# Patient Record
Sex: FEMALE | Race: WHITE | Employment: OTHER | URBAN - METROPOLITAN AREA
[De-identification: names, ages, dates, MRNs, and addresses within clinical notes are randomized per-mention and may not be internally consistent; named-entity substitution may affect disease eponyms.]

---

## 2017-01-03 ENCOUNTER — TRANSFERRED RECORDS (OUTPATIENT)
Dept: HEALTH INFORMATION MANAGEMENT | Facility: CLINIC | Age: 62
End: 2017-01-03

## 2017-10-16 ENCOUNTER — TRANSFERRED RECORDS (OUTPATIENT)
Dept: HEALTH INFORMATION MANAGEMENT | Facility: CLINIC | Age: 62
End: 2017-10-16

## 2017-10-27 ENCOUNTER — TRANSFERRED RECORDS (OUTPATIENT)
Dept: HEALTH INFORMATION MANAGEMENT | Facility: CLINIC | Age: 62
End: 2017-10-27

## 2017-10-30 ENCOUNTER — TRANSFERRED RECORDS (OUTPATIENT)
Dept: HEALTH INFORMATION MANAGEMENT | Facility: CLINIC | Age: 62
End: 2017-10-30

## 2018-03-09 ENCOUNTER — OFFICE VISIT (OUTPATIENT)
Dept: FAMILY MEDICINE | Facility: CLINIC | Age: 63
End: 2018-03-09
Payer: COMMERCIAL

## 2018-03-09 VITALS
HEIGHT: 69 IN | BODY MASS INDEX: 20.06 KG/M2 | SYSTOLIC BLOOD PRESSURE: 134 MMHG | TEMPERATURE: 98.4 F | RESPIRATION RATE: 16 BRPM | DIASTOLIC BLOOD PRESSURE: 78 MMHG | OXYGEN SATURATION: 96 % | WEIGHT: 135.4 LBS | HEART RATE: 64 BPM

## 2018-03-09 DIAGNOSIS — E03.9 HYPOTHYROIDISM, UNSPECIFIED TYPE: Primary | ICD-10-CM

## 2018-03-09 DIAGNOSIS — F32.A DEPRESSION, UNSPECIFIED DEPRESSION TYPE: ICD-10-CM

## 2018-03-09 DIAGNOSIS — D22.9 MULTIPLE NEVI: ICD-10-CM

## 2018-03-09 DIAGNOSIS — Z85.89 HISTORY OF SQUAMOUS CELL CARCINOMA: ICD-10-CM

## 2018-03-09 DIAGNOSIS — Z90.710 H/O HYSTERECTOMY FOR BENIGN DISEASE: ICD-10-CM

## 2018-03-09 DIAGNOSIS — H40.9 GLAUCOMA OF LEFT EYE, UNSPECIFIED GLAUCOMA TYPE: ICD-10-CM

## 2018-03-09 PROCEDURE — 99204 OFFICE O/P NEW MOD 45 MIN: CPT | Performed by: NURSE PRACTITIONER

## 2018-03-09 RX ORDER — SERTRALINE HYDROCHLORIDE 100 MG/1
TABLET, FILM COATED ORAL DAILY
COMMUNITY
End: 2018-12-11

## 2018-03-09 RX ORDER — GLUCOSAM/CHONDRO/HERB 149/HYAL 750-100 MG
TABLET ORAL DAILY
COMMUNITY

## 2018-03-09 RX ORDER — TRAZODONE HYDROCHLORIDE 50 MG/1
TABLET, FILM COATED ORAL AT BEDTIME
COMMUNITY
End: 2018-12-11

## 2018-03-09 RX ORDER — TIMOLOL MALEATE 5 MG/ML
1 SOLUTION/ DROPS OPHTHALMIC DAILY
COMMUNITY
End: 2018-04-20

## 2018-03-09 RX ORDER — LEVOTHYROXINE SODIUM 88 UG/1
88 TABLET ORAL DAILY
COMMUNITY
End: 2018-12-12

## 2018-03-09 ASSESSMENT — ANXIETY QUESTIONNAIRES
1. FEELING NERVOUS, ANXIOUS, OR ON EDGE: SEVERAL DAYS
5. BEING SO RESTLESS THAT IT IS HARD TO SIT STILL: NOT AT ALL
GAD7 TOTAL SCORE: 4
3. WORRYING TOO MUCH ABOUT DIFFERENT THINGS: SEVERAL DAYS
IF YOU CHECKED OFF ANY PROBLEMS ON THIS QUESTIONNAIRE, HOW DIFFICULT HAVE THESE PROBLEMS MADE IT FOR YOU TO DO YOUR WORK, TAKE CARE OF THINGS AT HOME, OR GET ALONG WITH OTHER PEOPLE: NOT DIFFICULT AT ALL
7. FEELING AFRAID AS IF SOMETHING AWFUL MIGHT HAPPEN: SEVERAL DAYS
6. BECOMING EASILY ANNOYED OR IRRITABLE: NOT AT ALL
2. NOT BEING ABLE TO STOP OR CONTROL WORRYING: SEVERAL DAYS

## 2018-03-09 ASSESSMENT — PAIN SCALES - GENERAL: PAINLEVEL: NO PAIN (0)

## 2018-03-09 ASSESSMENT — PATIENT HEALTH QUESTIONNAIRE - PHQ9: 5. POOR APPETITE OR OVEREATING: NOT AT ALL
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
## 2018-03-09 NOTE — MR AVS SNAPSHOT
After Visit Summary   3/9/2018    Alma Rosa Bhat    MRN: [id number]           Patient Information     Date Of Birth          1955        Visit Information        Provider Department      3/9/2018 9:00 AM Jamaica Zamora APRN CNP [facility]        Today's Diagnoses     Hypothyroidism, unspecified type    -  1    Depression, unspecified depression type        Multiple nevi           Follow-ups after your visit        Additional Services     DERMATOLOGY REFERRAL       Your provider has referred you to: [facility] Sarah [facility]lap (2 locations) [phone]   [email or web address]    Please be aware that coverage of these services is subject to the terms and limitations of your health insurance plan.  Call member services at your health plan with any benefit or coverage questions.      Please bring the following with you to your appointment:    (1) Any X-Rays, CTs or MRIs which have been performed.  Contact the facility where they were done to arrange for  prior to your scheduled appointment.    (2) List of current medications  (3) This referral request   (4) Any documents/labs given to you for this referral                  Your next 10 appointments already scheduled     Mar 19, 2018 12:00 PM CDT   New Visit with Héctor Padron OD   [facility] ([facility])    [address] [facility] [address] [phone]              Who to contact     If you have questions or need follow up information about today's clinic visit or your schedule please contact [facility] directly at [phone].  Normal or non-critical lab and imaging results will be communicated to you by MyChart, letter or phone within 4 business days after the clinic has received the results. If you do not hear from us within 7 days, please contact the clinic through MyChart or phone. If you have  "a critical or abnormal lab result, we will notify you by phone as soon as possible.  Submit refill requests through Riiid or call your pharmacy and they will forward the refill request to us. Please allow 3 business days for your refill to be completed.          Additional Information About Your Visit        MyOtherDrivehart Information     Riiid gives you secure access to your electronic health record. If you see a primary care provider, you can also send messages to your care team and make appointments. If you have questions, please call your primary care clinic.  If you do not have a primary care provider, please call [phone] and they will assist you.        Care EveryWhere ID     This is your Care EveryWhere ID. This could be used by other organizations to access your [facility] medical records  [id number]        Your Vitals Were     Pulse Temperature Respirations Height Pulse Oximetry BMI (Body Mass Index)    64 98.4  F (36.9  C) (Oral) 16 1.74 m (5' 8.5\") 96% 20.29 kg/m2       Blood Pressure from Last 3 Encounters:   03/09/18 134/78    Weight from Last 3 Encounters:   03/09/18 61.4 kg (135 lb 6.4 oz)              We Performed the Following     DERMATOLOGY REFERRAL        Primary Care Provider Office Phone # Fax #    [facility] [phone]       [address]  [facility] [address]        Equal Access to Services     FLORESITA GARZA : Hadii aad ku hadasho Soomaali, waaxda luqadaha, qaybta kaalmada adeegyada, cheyenne oviedo . So [facility] [phone].    ATENCIÓN: Si habla español, tiene a schmidt disposición servicios gratuitos de asistencia lingüística. Llame al [phone].    We comply with applicable federal civil rights laws and [place] laws. We do not discriminate on the basis of race, color, national origin, age, disability, sex, sexual orientation, or gender identity.            Thank you!     Thank you for choosing [facility]" for your care. Our goal is always to provide you with excellent care. Hearing back from our patients is one way we can continue to improve our services. Please take a few minutes to complete the written survey that you may receive in the mail after your visit with us. Thank you!             Your Updated Medication List - Protect others around you: Learn how to safely use, store and throw away your medicines at www.disposemymeds.org.          This list is accurate as of 3/9/18  9:52 AM.  Always use your most recent med list.                   Brand Name Dispense Instructions for use Diagnosis    CALCIUM 500/VITAMIN D PO      Take by mouth daily        GLUCOSAMINE CHOND COMPLEX/MSM Tabs      Take by mouth daily        levothyroxine 88 MCG tablet    SYNTHROID/LEVOTHROID     Take 88 mcg by mouth daily        sertraline 100 MG tablet    ZOLOFT     Take by mouth daily        timolol 0.5 % ophthalmic solution    TIMOPTIC     Place 1 drop Into the left eye daily        traZODone 50 MG tablet    DESYREL     Take by mouth At Bedtime

## 2018-03-10 ASSESSMENT — PATIENT HEALTH QUESTIONNAIRE - PHQ9: SUM OF ALL RESPONSES TO PHQ QUESTIONS 1-9: 3

## 2018-03-10 ASSESSMENT — ANXIETY QUESTIONNAIRES: GAD7 TOTAL SCORE: 4

## 2018-03-19 ENCOUNTER — OFFICE VISIT (OUTPATIENT)
Dept: OPTOMETRY | Facility: CLINIC | Age: 63
End: 2018-03-19
Payer: COMMERCIAL

## 2018-03-19 DIAGNOSIS — H52.03 HYPERMETROPIA OF BOTH EYES: ICD-10-CM

## 2018-03-19 DIAGNOSIS — H40.1121 PRIMARY OPEN ANGLE GLAUCOMA OF LEFT EYE, MILD STAGE: Primary | ICD-10-CM

## 2018-03-19 DIAGNOSIS — Z98.890 HISTORY OF RADIAL KERATOTOMY: ICD-10-CM

## 2018-03-19 DIAGNOSIS — H10.13 ALLERGIC CONJUNCTIVITIS OF BOTH EYES: ICD-10-CM

## 2018-03-19 DIAGNOSIS — H52.223 REGULAR ASTIGMATISM OF BOTH EYES: ICD-10-CM

## 2018-03-19 DIAGNOSIS — H25.13 AGE-RELATED NUCLEAR CATARACT OF BOTH EYES: ICD-10-CM

## 2018-03-19 DIAGNOSIS — H52.4 PRESBYOPIA: ICD-10-CM

## 2018-03-19 PROCEDURE — 92004 COMPRE OPH EXAM NEW PT 1/>: CPT | Performed by: OPTOMETRIST

## 2018-03-19 PROCEDURE — 92015 DETERMINE REFRACTIVE STATE: CPT | Performed by: OPTOMETRIST

## 2018-03-19 RX ORDER — AZELASTINE HYDROCHLORIDE 0.5 MG/ML
1 SOLUTION/ DROPS OPHTHALMIC 2 TIMES DAILY
Qty: 1 BOTTLE | Refills: 11 | Status: SHIPPED | OUTPATIENT
Start: 2018-03-19

## 2018-03-19 ASSESSMENT — REFRACTION_MANIFEST
OD_ADD: +2.75
OD_CYLINDER: +0.75
OD_AXIS: 080
OS_ADD: +2.75
OS_AXIS: 070
OS_SPHERE: +2.50
OD_SPHERE: +2.25
OS_CYLINDER: +0.50

## 2018-03-19 ASSESSMENT — VISUAL ACUITY
OD_CC: 20/30-2
OS_SC: 20/250
OD_CC+: -1
METHOD: SNELLEN - LINEAR
OS_CC: 20/30
OS_CC+: -1
OD_SC: 20/150
OS_CC: 20/20
OD_CC: 20/20
CORRECTION_TYPE: GLASSES

## 2018-03-19 ASSESSMENT — REFRACTION_WEARINGRX
OD_ADD: +2.75
OD_CYLINDER: +0.50
OD_SPHERE: +2.25
SPECS_TYPE: PAL
OS_ADD: +2.75
OS_CYLINDER: +0.75
OD_AXIS: 080
OS_AXIS: 060
OS_SPHERE: +2.50

## 2018-03-19 ASSESSMENT — EXTERNAL EXAM - RIGHT EYE: OD_EXAM: NORMAL

## 2018-03-19 ASSESSMENT — EXTERNAL EXAM - LEFT EYE: OS_EXAM: NORMAL

## 2018-03-19 ASSESSMENT — TONOMETRY
IOP_METHOD: TONOPEN
OD_IOP_MMHG: 19
OS_IOP_MMHG: 20

## 2018-03-19 ASSESSMENT — SLIT LAMP EXAM - LIDS
COMMENTS: NORMAL
COMMENTS: NORMAL

## 2018-03-19 ASSESSMENT — CONF VISUAL FIELD
OD_NORMAL: 1
OS_NORMAL: 1

## 2018-03-19 ASSESSMENT — CUP TO DISC RATIO
OD_RATIO: 0.2
OS_RATIO: 0.2

## 2018-03-19 NOTE — LETTER
3/19/2018         RE: Alma Rosa Bhat  [address]  [place] [place] [address]        Dear Colleague,    Thank you for referring your patient, Alma Rosa Bhat, to the [facility]. Please see a copy of my visit note below.    Chief Complaint   Patient presents with     COMPREHENSIVE EYE EXAM     Glaucoma pt needs IOP         Last Eye Exam: 2-2017  Dilated Previously: Yes    What are you currently using to see?  glasses       Distance Vision Acuity: Noticed gradual change in both eyes    Near Vision Acuity: Satisfied with vision while reading  with glasses    Eye Comfort: good.os gets red a lot  Do you use eye drops? : Yes: timolol in AM,visine - had used Travatan- but pressure was not under control-  Occupation or Hobbies: retired    Moved from [place], [place]  Was being followed for glaucoma left eye only.  IOPs were as high as low 30s- Timolol- 5 % was most recently used with success.  Last Oct. was in the low 20s.    Brenda Hanson Optometric Assistant, A.B.O.C.          Medical, surgical and family histories reviewed and updated 3/19/2018.       OBJECTIVE: See Ophthalmology exam    ASSESSMENT:    ICD-10-CM    1. Primary open angle glaucoma of left eye, mild stage H40.1121 EYE EXAM (SIMPLE-NONBILLABLE)   2. Allergic conjunctivitis of both eyes H10.13 EYE EXAM (SIMPLE-NONBILLABLE)     azelastine (OPTIVAR) 0.05 % SOLN ophthalmic solution   3. Age-related nuclear cataract of both eyes H25.13 EYE EXAM (SIMPLE-NONBILLABLE)   4. History of radial keratotomy Z98.890 EYE EXAM (SIMPLE-NONBILLABLE)   5. Hypermetropia of both eyes H52.03 REFRACTION   6. Regular astigmatism of both eyes H52.223 REFRACTION   7. Presbyopia H52.4 REFRACTION      PLAN:     Patient Instructions   Continue Timoptic .5 % 1 drop left eye in am.    Schedule OCT/VF at [place].    Optivar- 1 drop both eyes 2 x day as needed for itchy eyes.    You have the start of mild cataracts.  You may notice some blurred vision or  glare with night driving.  It is important that you wear good sunglasses to protect your eyes from the ultraviolet light from the sun.  Taking a supplement with at least 300 mg/day of vitamin C appears to help prevent cataract development.  I recommend that you return in 1 year for an eye exam unless there are any sudden changes in your vision.       Return in 1 year for a complete eye exam or sooner if needed.    Héctor Padron, OD           Again, thank you for allowing me to participate in the care of your patient.        Sincerely,        Héctor Padron, OD

## 2018-03-19 NOTE — PATIENT INSTRUCTIONS
Continue Timoptic .5 % 1 drop left eye in am.    Schedule OCT/VF at [place].    Optivar- 1 drop both eyes 2 x day as needed for itchy eyes.    Genteal instead of Visine- 2-4 x day.    You have the start of mild cataracts.  You may notice some blurred vision or glare with night driving.  It is important that you wear good sunglasses to protect your eyes from the ultraviolet light from the sun.  Taking a supplement with at least 300 mg/day of vitamin C appears to help prevent cataract development.  I recommend that you return in 1 year for an eye exam unless there are any sudden changes in your vision.       Return in 1 year for a complete eye exam or sooner if needed.    Héctor Padron, OD    The affects of the dilating drops last for 4- 6 hours.  You will be more sensitive to light and vision will be blurry up close.  Mydriatic sunglasses were given if needed.      Optometry Providers       Clinic Locations                                 Telephone Number   Dr. Annabelle Sanabria [facility] [phone]     [facility] Hours:                [facility] Hours:       [facility] Hours:   [address]   [address]     [address] [place] [address]   [place] [address]    [place] [address]  Phone: [phone]                    Phone: [phone]     Phone: [phone]                      Monday 8:00-7:00                          Monday 8:00-7:00                          Monday 8:00-7:00              Tuesday 8:00-6:00                          Tuesday 8:00-7:00                          Tuesday 8:00-7:00              Wednesday 8:00-6:00                  Wednesday 8:00-7:00                   Wednesday 8:00-7:00      Thursday 8:00-6:00                        Thursday 8:00-7:00                         Thursday 8:00-7:00            Friday 8:00-5:00                               Friday 8:00-5:00                              Friday 8:00-5:00    [facility] Hours:   [address] [place] [address]  [phone]    Monday 8:00-7:00  Tuesday 8:00-7:00  Wednesday 8:00-7:00  Thursday 8:00-7:00  Friday 8:00-5:00  Please log on to [email or web address] to order your contact lenses.  The link is found on the Eye Care and Vision Services page.  As always, Thank you for trusting us with your health care needs!

## 2018-03-19 NOTE — MR AVS SNAPSHOT
After Visit Summary   3/19/2018    Alma Rosa Bhat    MRN: [id number]           Patient Information     Date Of Birth          1955        Visit Information        Provider Department      3/19/2018 12:00 PM Héctor Padron, MIGUEL [facility]        Today's Diagnoses     Primary open angle glaucoma of left eye, mild stage    -  1    Allergic conjunctivitis of both eyes        Age-related nuclear cataract of both eyes        History of radial keratotomy        Hypermetropia of both eyes        Regular astigmatism of both eyes        Presbyopia          Care Instructions    Continue Timoptic .5 % 1 drop left eye in am.    Schedule OCT/VF at [place].    Optivar- 1 drop both eyes 2 x day as needed for itchy eyes.    Genteal instead of Visine- 2-4 x day.    You have the start of mild cataracts.  You may notice some blurred vision or glare with night driving.  It is important that you wear good sunglasses to protect your eyes from the ultraviolet light from the sun.  Taking a supplement with at least 300 mg/day of vitamin C appears to help prevent cataract development.  I recommend that you return in 1 year for an eye exam unless there are any sudden changes in your vision.       Return in 1 year for a complete eye exam or sooner if needed.    Héctor Padron, MIGUEL    The affects of the dilating drops last for 4- 6 hours.  You will be more sensitive to light and vision will be blurry up close.  Mydriatic sunglasses were given if needed.      Optometry Providers       Clinic Locations                                 Telephone Number   Dr. Annabelle Sanabria [facility]   [place] [phone]     [facility] Hours:                [facility] Hours:       [facility] Hours:   [address]   [address]     [address] [place] [address]    [place] [address]    [place] [address]  Phone: [phone]                    Phone: [phone]     Phone: [phone]                      Monday 8:00-7:00                          Monday 8:00-7:00                          Monday 8:00-7:00              Tuesday 8:00-6:00                          Tuesday 8:00-7:00                          Tuesday 8:00-7:00              Wednesday 8:00-6:00                  Wednesday 8:00-7:00                   Wednesday 8:00-7:00      Thursday 8:00-6:00                        Thursday 8:00-7:00                         Thursday 8:00-7:00            Friday 8:00-5:00                              Friday 8:00-5:00                              Friday 8:00-5:00    [facility] Hours:   [address] [place] [address]  [phone]    Monday 8:00-7:00  Tuesday 8:00-7:00  Wednesday 8:00-7:00  Thursday 8:00-7:00  Friday 8:00-5:00  Please log on to [place] to order your contact lenses.  The link is found on the Eye Care and Vision Services page.  As always, Thank you for trusting us with your health care needs!              Follow-ups after your visit        Follow-up notes from your care team     Return for OCT/VF.      Your next 10 appointments already scheduled     Apr 20, 2018  8:40 AM CDT   Return Visit with Héctor Padron OD   [facility]    [address] [place] [address] [phone]              Who to contact     If you have questions or need follow up information about today's clinic visit or your schedule please contact [facility] directly at [phone].  Normal or non-critical lab and imaging results will be communicated to you by MyChart, letter or phone within 4 business days after the clinic has received the results. If you do not hear from us within 7 days, please contact the clinic through MyChart or phone. If you have a critical or abnormal lab result, we will  notify you by phone as soon as possible.  Submit refill requests through TeensSuccess or call your pharmacy and they will forward the refill request to us. Please allow 3 business days for your refill to be completed.          Additional Information About Your Visit        Back9 NetworkharStockr Information     [employer]Success gives you secure access to your electronic health record. If you see a primary care provider, you can also send messages to your care team and make appointments. If you have questions, please call your primary care clinic.  If you do not have a primary care provider, please call [phone] and they will assist you.        Care EveryWhere ID     This is your Care EveryWhere ID. This could be used by other organizations to access your [facility] medical records  [id number]         Blood Pressure from Last 3 Encounters:   03/09/18 134/78    Weight from Last 3 Encounters:   03/09/18 61.4 kg (135 lb 6.4 oz)              We Performed the Following     EYE EXAM (SIMPLE-NONBILLABLE)     REFRACTION          Today's Medication Changes          These changes are accurate as of 3/19/18  1:32 PM.  If you have any questions, ask your nurse or doctor.               Start taking these medicines.        Dose/Directions    azelastine 0.05 % Soln ophthalmic solution   Commonly known as:  OPTIVAR   Used for:  Allergic conjunctivitis of both eyes   Started by:  Héctor Padron, OD        Dose:  1 drop   Place 1 drop into both eyes 2 times daily   Quantity:  1 Bottle   Refills:  11            Where to get your medicines      These medications were sent to [facility] [address]   [address], [facility] [address]     Phone:  [phone]     azelastine 0.05 % Soln ophthalmic solution                Primary Care Provider Office Phone # Fax #    [facility] [phone]       [address]  [facility] [address]        Equal Access to Services     FLORESITA GARZA  AH: Danaii baldo amaya Benali, waaxda luqadaha, qaybta kakendal zacjerson, cheyenne krishnan haydiamond interianoalanoren nicole. So [facility] [phone].    ATENCIÓN: Si habla david, tiene a schmidt disposición servicios gratuitos de asistencia lingüística. Llame al [phone].    We comply with applicable federal civil rights laws and [place] laws. We do not discriminate on the basis of race, color, national origin, age, disability, sex, sexual orientation, or gender identity.            Thank you!     Thank you for choosing [facility]  for your care. Our goal is always to provide you with excellent care. Hearing back from our patients is one way we can continue to improve our services. Please take a few minutes to complete the written survey that you may receive in the mail after your visit with us. Thank you!             Your Updated Medication List - Protect others around you: Learn how to safely use, store and throw away your medicines at www.disposemymeds.org.          This list is accurate as of 3/19/18  1:32 PM.  Always use your most recent med list.                   Brand Name Dispense Instructions for use Diagnosis    azelastine 0.05 % Soln ophthalmic solution    OPTIVAR    1 Bottle    Place 1 drop into both eyes 2 times daily    Allergic conjunctivitis of both eyes       CALCIUM 500/VITAMIN D PO      Take by mouth daily        GLUCOSAMINE CHOND COMPLEX/MSM Tabs      Take by mouth daily        levothyroxine 88 MCG tablet    SYNTHROID/LEVOTHROID     Take 88 mcg by mouth daily        sertraline 100 MG tablet    ZOLOFT     Take by mouth daily        timolol 0.5 % ophthalmic solution    TIMOPTIC     Place 1 drop Into the left eye daily        traZODone 50 MG tablet    DESYREL     Take by mouth At Bedtime

## 2018-03-19 NOTE — PROGRESS NOTES
Chief Complaint   Patient presents with     COMPREHENSIVE EYE EXAM     Glaucoma pt needs IOP         Last Eye Exam: 2-2017  Dilated Previously: Yes    What are you currently using to see?  glasses       Distance Vision Acuity: Noticed gradual change in both eyes    Near Vision Acuity: Satisfied with vision while reading  with glasses    Eye Comfort: good.os gets red a lot  Do you use eye drops? : Yes: timolol in AM,visine - had used Travatan- but pressure was not under control-  Occupation or Hobbies: retired    Moved from [place]  Was being followed for glaucoma left eye only.  IOPs were as high as low 30s- Timolol- 5 % was most recently used with success.  Last Oct. was in the low 20s.    Brenda Hanson Optometric Assistant, A.B.O.C.          Medical, surgical and family histories reviewed and updated 3/19/2018.       OBJECTIVE: See Ophthalmology exam    ASSESSMENT:    ICD-10-CM    1. Primary open angle glaucoma of left eye, mild stage H40.1121 EYE EXAM (SIMPLE-NONBILLABLE)   2. Allergic conjunctivitis of both eyes H10.13 EYE EXAM (SIMPLE-NONBILLABLE)     azelastine (OPTIVAR) 0.05 % SOLN ophthalmic solution   3. Age-related nuclear cataract of both eyes H25.13 EYE EXAM (SIMPLE-NONBILLABLE)   4. History of radial keratotomy Z98.890 EYE EXAM (SIMPLE-NONBILLABLE)   5. Hypermetropia of both eyes H52.03 REFRACTION   6. Regular astigmatism of both eyes H52.223 REFRACTION   7. Presbyopia H52.4 REFRACTION      PLAN:     Patient Instructions   Continue Timoptic .5 % 1 drop left eye in am.    Schedule OCT/VF at [place].    Optivar- 1 drop both eyes 2 x day as needed for itchy eyes.    You have the start of mild cataracts.  You may notice some blurred vision or glare with night driving.  It is important that you wear good sunglasses to protect your eyes from the ultraviolet light from the sun.  Taking a supplement with at least 300 mg/day of vitamin C appears to help prevent cataract development.  I recommend  that you return in 1 year for an eye exam unless there are any sudden changes in your vision.       Return in 1 year for a complete eye exam or sooner if needed.    Héctor Padron, OD

## 2018-03-26 PROBLEM — K57.30 DIVERTICULOSIS OF LARGE INTESTINE WITHOUT HEMORRHAGE: Chronic | Status: ACTIVE | Noted: 2018-03-26

## 2018-03-26 PROBLEM — K57.30 DIVERTICULOSIS OF LARGE INTESTINE WITHOUT HEMORRHAGE: Status: ACTIVE | Noted: 2018-03-26

## 2018-03-26 PROBLEM — E78.5 HYPERLIPIDEMIA LDL GOAL <130: Status: ACTIVE | Noted: 2018-03-26

## 2018-04-20 ENCOUNTER — OFFICE VISIT (OUTPATIENT)
Dept: OPTOMETRY | Facility: CLINIC | Age: 63
End: 2018-04-20
Payer: COMMERCIAL

## 2018-04-20 DIAGNOSIS — H40.1121 PRIMARY OPEN ANGLE GLAUCOMA OF LEFT EYE, MILD STAGE: Primary | ICD-10-CM

## 2018-04-20 PROCEDURE — 92083 EXTENDED VISUAL FIELD XM: CPT | Performed by: OPTOMETRIST

## 2018-04-20 PROCEDURE — 76514 ECHO EXAM OF EYE THICKNESS: CPT | Performed by: OPTOMETRIST

## 2018-04-20 PROCEDURE — 99213 OFFICE O/P EST LOW 20 MIN: CPT | Performed by: OPTOMETRIST

## 2018-04-20 PROCEDURE — 92133 CPTRZD OPH DX IMG PST SGM ON: CPT | Performed by: OPTOMETRIST

## 2018-04-20 RX ORDER — TIMOLOL MALEATE 5 MG/ML
1 SOLUTION/ DROPS OPHTHALMIC DAILY
Qty: 1 BOTTLE | Refills: 11 | Status: SHIPPED | OUTPATIENT
Start: 2018-04-20 | End: 2019-06-18

## 2018-04-20 ASSESSMENT — VISUAL ACUITY
METHOD: SNELLEN - LINEAR
OS_CC: 20/20
OS_CC+: -1
OD_CC: 20/20
CORRECTION_TYPE: GLASSES

## 2018-04-20 ASSESSMENT — TONOMETRY
IOP_METHOD: TONOPEN
OS_IOP_MMHG: 17
OD_IOP_MMHG: 18

## 2018-04-20 ASSESSMENT — SLIT LAMP EXAM - LIDS
COMMENTS: NORMAL
COMMENTS: NORMAL

## 2018-04-20 ASSESSMENT — PACHYMETRY
OD_CT(UM): 567
OS_CT(UM): 564

## 2018-04-20 ASSESSMENT — EXTERNAL EXAM - LEFT EYE: OS_EXAM: NORMAL

## 2018-04-20 ASSESSMENT — CUP TO DISC RATIO
OD_RATIO: 0.2
OS_RATIO: 0.2

## 2018-04-20 ASSESSMENT — EXTERNAL EXAM - RIGHT EYE: OD_EXAM: NORMAL

## 2018-04-20 NOTE — PROGRESS NOTES
CHIEF COMPLAINT:   Chief Complaint   Patient presents with     Glaucoma Follow Up     OCT and Visual Field       Has been using Timoptic .5 % - 1 drop left eye due to increased IOP.    OBJECTIVE:     See ophthalmology exam    ASSESSMENT:         ICD-10-CM    1. Primary open angle glaucoma of left eye, mild stage H40.1121 timolol (TIMOPTIC) 0.5 % ophthalmic solution      OPHTHALMIC DIAG, PACHYMETRY     HVF 24-2 OD     OCT Optic Nerve RNFL Optovue OU (both eyes)   History of increased IOP left eye  IOPs within normal limits both eyes   Visual Field- within normal limits both eyes   OCT- within normal limits both eyes   Thick corneas both eyes     PLAN:      Patient Instructions   Continue Timoptic .5 % 1 drop left eye daily.    Return in 1 year for a complete eye exam and glaucoma testing or sooner if needed.    Héctor Padron, MIGUEL

## 2018-04-20 NOTE — MR AVS SNAPSHOT
After Visit Summary   4/20/2018    Alma Rosa Bhat    MRN: [id number]           Patient Information     Date Of Birth          1955        Visit Information        Provider Department      4/20/2018 8:40 AM Héctor Padron, OD [facility]        Today's Diagnoses     Primary open angle glaucoma of left eye, mild stage    -  1      Care Instructions    Continue Timoptic .5 % 1 drop left eye daily.    Return in 1 year for a complete eye exam and glaucoma testing or sooner if needed.    Héctor Padron OD            Follow-ups after your visit        Follow-up notes from your care team     Return in about 1 year (around 4/20/2019) for Annual Visit.      Who to contact     If you have questions or need follow up information about today's clinic visit or your schedule please contact [facility] directly at [phone].  Normal or non-critical lab and imaging results will be communicated to you by FluxDrivehart, letter or phone within 4 business days after the clinic has received the results. If you do not hear from us within 7 days, please contact the clinic through FluxDrivehart or phone. If you have a critical or abnormal lab result, we will notify you by phone as soon as possible.  Submit refill requests through SocialMeterTV or call your pharmacy and they will forward the refill request to us. Please allow 3 business days for your refill to be completed.          Additional Information About Your Visit        FluxDrivehart Information     SocialMeterTV gives you secure access to your electronic health record. If you see a primary care provider, you can also send messages to your care team and make appointments. If you have questions, please call your primary care clinic.  If you do not have a primary care provider, please call [phone] and they will assist you.      SocialMeterTV is an electronic gateway that provides easy, online access to your medical records. With SocialMeterTV, you can request a  clinic appointment, read your test results, renew a prescription or communicate with your care team.     To access your existing account, please contact your [facility] or call [phone] for assistance.        Care EveryWhere ID     This is your Care EveryWhere ID. This could be used by other organizations to access your [facility] medical records  [id number]         Blood Pressure from Last 3 Encounters:   03/09/18 134/78    Weight from Last 3 Encounters:   03/09/18 61.4 kg (135 lb 6.4 oz)              We Performed the Following     HVF 24-2 OD     OCT Optic Nerve RNFL Optovue OU (both eyes)     US OPHTHALMIC DIAG, PACHYMETRY          Where to get your medicines      These medications were sent to [facility] - [facility] [address]  [address], [facility] [address]     Phone:  [phone]     timolol 0.5 % ophthalmic solution          Primary Care Provider Office Phone # Fax #    [facility] [phone] [address]  [facility] [address]        Equal Access to Services     CAROLYN GARZA : Hadii aad ku hadasho Soomaali, waaxda luqadaha, qaybta kaalmada adeegyada, waxay eulogio oviedo . So [facility] [phone].    ATENCIÓN: Si habla español, tiene a schmidt disposición servicios gratuitos de asistencia lingüística. [facility] [phone].    We comply with applicable federal civil rights laws and [place] laws. We do not discriminate on the basis of race, color, national origin, age, disability, sex, sexual orientation, or gender identity.            Thank you!     Thank you for choosing [facility]  for your care. Our goal is always to provide you with excellent care. Hearing back from our patients is one way we can continue to improve our services. Please take a few minutes to complete the written survey that you may receive in the mail after your visit with us. Thank  you!             Your Updated Medication List - Protect others around you: Learn how to safely use, store and throw away your medicines at www.disposemymeds.org.          This list is accurate as of 4/20/18  9:51 AM.  Always use your most recent med list.                   Brand Name Dispense Instructions for use Diagnosis    azelastine 0.05 % Soln ophthalmic solution    OPTIVAR    1 Bottle    Place 1 drop into both eyes 2 times daily    Allergic conjunctivitis of both eyes       CALCIUM 500/VITAMIN D PO      Take by mouth daily        estradiol 0.1 MG/GM cream    ESTRACE    42.5 g    Place 2 g vaginally twice a week        GLUCOSAMINE CHOND COMPLEX/MSM Tabs      Take by mouth daily        levothyroxine 88 MCG tablet    SYNTHROID/LEVOTHROID     Take 88 mcg by mouth daily        sertraline 100 MG tablet    ZOLOFT     Take by mouth daily        timolol 0.5 % ophthalmic solution    TIMOPTIC    1 Bottle    Place 1 drop Into the left eye daily    Primary open angle glaucoma of left eye, mild stage       traZODone 50 MG tablet    DESYREL     Take by mouth At Bedtime

## 2018-04-20 NOTE — PATIENT INSTRUCTIONS
Continue Timoptic .5 % 1 drop left eye daily.    Return in 1 year for a complete eye exam and glaucoma testing or sooner if needed.    Héctor Padron, OD

## 2018-04-20 NOTE — LETTER
4/20/2018         RE: Alma Rosa Bhat  [address] [place] [place] [address]        Dear Colleague,    Thank you for referring your patient, Alma Rosa Bhat, to the [facility]. Please see a copy of my visit note below.    CHIEF COMPLAINT:   Chief Complaint   Patient presents with     Glaucoma Follow Up     OCT and Visual Field       Has been using Timoptic .5 % - 1 drop left eye due to increased IOP.    OBJECTIVE:     See ophthalmology exam    ASSESSMENT:         ICD-10-CM    1. Primary open angle glaucoma of left eye, mild stage H40.1121 timolol (TIMOPTIC) 0.5 % ophthalmic solution      OPHTHALMIC DIAG, PACHYMETRY     HVF 24-2 OD     OCT Optic Nerve RNFL Optovue OU (both eyes)   History of increased IOP left eye  IOPs within normal limits both eyes   Visual Field- within normal limits both eyes   OCT- within normal limits both eyes   Thick corneas both eyes     PLAN:      Patient Instructions   Continue Timoptic .5 % 1 drop left eye daily.    Return in 1 year for a complete eye exam and glaucoma testing or sooner if needed.    Héctor Padron, MIGUEL          Again, thank you for allowing me to participate in the care of your patient.        Sincerely,        Héctor Padron, OD

## 2018-12-11 ENCOUNTER — OFFICE VISIT (OUTPATIENT)
Dept: FAMILY MEDICINE | Facility: CLINIC | Age: 63
End: 2018-12-11
Payer: COMMERCIAL

## 2018-12-11 ENCOUNTER — ANCILLARY PROCEDURE (OUTPATIENT)
Dept: MAMMOGRAPHY | Facility: CLINIC | Age: 63
End: 2018-12-11
Attending: NURSE PRACTITIONER
Payer: COMMERCIAL

## 2018-12-11 VITALS
OXYGEN SATURATION: 97 % | DIASTOLIC BLOOD PRESSURE: 78 MMHG | HEIGHT: 69 IN | HEART RATE: 72 BPM | WEIGHT: 141 LBS | TEMPERATURE: 97.7 F | BODY MASS INDEX: 20.88 KG/M2 | SYSTOLIC BLOOD PRESSURE: 118 MMHG

## 2018-12-11 DIAGNOSIS — E03.9 HYPOTHYROIDISM, UNSPECIFIED TYPE: ICD-10-CM

## 2018-12-11 DIAGNOSIS — F32.A DEPRESSION, UNSPECIFIED DEPRESSION TYPE: ICD-10-CM

## 2018-12-11 DIAGNOSIS — E78.5 HYPERLIPIDEMIA LDL GOAL <130: ICD-10-CM

## 2018-12-11 DIAGNOSIS — Z23 NEED FOR PROPHYLACTIC VACCINATION AGAINST STREPTOCOCCUS PNEUMONIAE (PNEUMOCOCCUS): ICD-10-CM

## 2018-12-11 DIAGNOSIS — Z23 NEED FOR PROPHYLACTIC VACCINATION AND INOCULATION AGAINST INFLUENZA: ICD-10-CM

## 2018-12-11 DIAGNOSIS — Z12.31 VISIT FOR SCREENING MAMMOGRAM: ICD-10-CM

## 2018-12-11 DIAGNOSIS — Z00.00 ENCOUNTER FOR ROUTINE ADULT HEALTH EXAMINATION WITHOUT ABNORMAL FINDINGS: Primary | ICD-10-CM

## 2018-12-11 DIAGNOSIS — Z23 NEED FOR TDAP VACCINATION: ICD-10-CM

## 2018-12-11 DIAGNOSIS — Z23 NEED FOR ZOSTER VACCINE: ICD-10-CM

## 2018-12-11 LAB
ANION GAP SERPL CALCULATED.3IONS-SCNC: 6 MMOL/L (ref 3–14)
BUN SERPL-MCNC: 11 MG/DL (ref 7–30)
CALCIUM SERPL-MCNC: 8.6 MG/DL (ref 8.5–10.1)
CHLORIDE SERPL-SCNC: 105 MMOL/L (ref 94–109)
CHOLEST SERPL-MCNC: 232 MG/DL
CO2 SERPL-SCNC: 31 MMOL/L (ref 20–32)
CREAT SERPL-MCNC: 0.73 MG/DL (ref 0.52–1.04)
GFR SERPL CREATININE-BSD FRML MDRD: 79 ML/MIN/1.7M2
GLUCOSE SERPL-MCNC: 91 MG/DL (ref 70–99)
HDLC SERPL-MCNC: 56 MG/DL
LDLC SERPL CALC-MCNC: 149 MG/DL
NONHDLC SERPL-MCNC: 176 MG/DL
POTASSIUM SERPL-SCNC: 4.6 MMOL/L (ref 3.4–5.3)
SODIUM SERPL-SCNC: 142 MMOL/L (ref 133–144)
T4 FREE SERPL-MCNC: 1.12 NG/DL (ref 0.76–1.46)
TRIGL SERPL-MCNC: 133 MG/DL
TSH SERPL DL<=0.005 MIU/L-ACNC: 4.39 MU/L (ref 0.4–4)

## 2018-12-11 PROCEDURE — 90472 IMMUNIZATION ADMIN EACH ADD: CPT | Performed by: NURSE PRACTITIONER

## 2018-12-11 PROCEDURE — 77067 SCR MAMMO BI INCL CAD: CPT | Mod: TC

## 2018-12-11 PROCEDURE — 84443 ASSAY THYROID STIM HORMONE: CPT | Performed by: NURSE PRACTITIONER

## 2018-12-11 PROCEDURE — 90750 HZV VACC RECOMBINANT IM: CPT | Performed by: NURSE PRACTITIONER

## 2018-12-11 PROCEDURE — 80061 LIPID PANEL: CPT | Performed by: NURSE PRACTITIONER

## 2018-12-11 PROCEDURE — G0438 PPPS, INITIAL VISIT: HCPCS | Performed by: NURSE PRACTITIONER

## 2018-12-11 PROCEDURE — 84439 ASSAY OF FREE THYROXINE: CPT | Performed by: NURSE PRACTITIONER

## 2018-12-11 PROCEDURE — 90732 PPSV23 VACC 2 YRS+ SUBQ/IM: CPT | Performed by: NURSE PRACTITIONER

## 2018-12-11 PROCEDURE — 80048 BASIC METABOLIC PNL TOTAL CA: CPT | Performed by: NURSE PRACTITIONER

## 2018-12-11 PROCEDURE — 90715 TDAP VACCINE 7 YRS/> IM: CPT | Performed by: NURSE PRACTITIONER

## 2018-12-11 PROCEDURE — 36415 COLL VENOUS BLD VENIPUNCTURE: CPT | Performed by: NURSE PRACTITIONER

## 2018-12-11 PROCEDURE — 90662 IIV NO PRSV INCREASED AG IM: CPT | Performed by: NURSE PRACTITIONER

## 2018-12-11 PROCEDURE — G0009 ADMIN PNEUMOCOCCAL VACCINE: HCPCS | Mod: 59 | Performed by: NURSE PRACTITIONER

## 2018-12-11 PROCEDURE — 90471 IMMUNIZATION ADMIN: CPT | Performed by: NURSE PRACTITIONER

## 2018-12-11 PROCEDURE — G0008 ADMIN INFLUENZA VIRUS VAC: HCPCS | Mod: 59 | Performed by: NURSE PRACTITIONER

## 2018-12-11 RX ORDER — SERTRALINE HYDROCHLORIDE 100 MG/1
50 TABLET, FILM COATED ORAL DAILY
Qty: 90 TABLET | Refills: 0 | Status: SHIPPED | OUTPATIENT
Start: 2018-12-11 | End: 2019-06-18

## 2018-12-11 RX ORDER — TRAZODONE HYDROCHLORIDE 50 MG/1
50 TABLET, FILM COATED ORAL AT BEDTIME
Qty: 30 TABLET | Refills: 3 | Status: SHIPPED | OUTPATIENT
Start: 2018-12-11 | End: 2019-10-30

## 2018-12-11 ASSESSMENT — PATIENT HEALTH QUESTIONNAIRE - PHQ9
5. POOR APPETITE OR OVEREATING: SEVERAL DAYS
SUM OF ALL RESPONSES TO PHQ QUESTIONS 1-9: 3

## 2018-12-11 ASSESSMENT — ANXIETY QUESTIONNAIRES
6. BECOMING EASILY ANNOYED OR IRRITABLE: SEVERAL DAYS
1. FEELING NERVOUS, ANXIOUS, OR ON EDGE: NOT AT ALL
3. WORRYING TOO MUCH ABOUT DIFFERENT THINGS: NOT AT ALL
2. NOT BEING ABLE TO STOP OR CONTROL WORRYING: NOT AT ALL
GAD7 TOTAL SCORE: 3
7. FEELING AFRAID AS IF SOMETHING AWFUL MIGHT HAPPEN: NOT AT ALL
5. BEING SO RESTLESS THAT IT IS HARD TO SIT STILL: SEVERAL DAYS

## 2018-12-11 ASSESSMENT — PAIN SCALES - GENERAL: PAINLEVEL: NO PAIN (0)

## 2018-12-11 ASSESSMENT — MIFFLIN-ST. JEOR: SCORE: 1241.01

## 2018-12-11 NOTE — NURSING NOTE
Screening Questionnaire for Adult Immunization    Are you sick today?   No   Do you have allergies to medications, food, a vaccine component or latex?   No   Have you ever had a serious reaction after receiving a vaccination?   No   Do you have a long-term health problem with heart disease, lung disease, asthma, kidney disease, metabolic disease (e.g. diabetes), anemia, or other blood disorder?   No   Do you have cancer, leukemia, HIV/AIDS, or any other immune system problem?   No   In the past 3 months, have you taken medications that affect  your immune system, such as prednisone, other steroids, or anticancer drugs; drugs for the treatment of rheumatoid arthritis, Crohn s disease, or psoriasis; or have you had radiation treatments?   No   Have you had a seizure, or a brain or other nervous system problem?   No   During the past year, have you received a transfusion of blood or blood     products, or been given immune (gamma) globulin or antiviral drug?   No   For women: Are you pregnant or is there a chance you could become        pregnant during the next month?   No   Have you received any vaccinations in the past 4 weeks?   No     Immunization questionnaire answers were all negative.        Per orders of provider, injection of Tdap, Shingrix and PCV 23 given by Josefina Perkins. Patient instructed to remain in clinic for 15 minutes afterwards, and to report any adverse reaction to me immediately.       Screening performed by Josefina Perkins on 12/11/2018 at 3:08 PM.

## 2018-12-11 NOTE — PROGRESS NOTES

## 2018-12-11 NOTE — PROGRESS NOTES
"SUBJECTIVE:   Alma Rosa Bhat is a 63 year old female who presents for Preventive Visit.    Are you in the first 12 months of your Medicare Part B coverage?  No    Physical Health:    In general, how would you rate your overall physical health? excellent    Outside of work, how many days during the week do you exercise? 2-3 days/week    Outside of work, approximately how many minutes a day do you exercise?30-45 minutes    If you drink alcohol do you typically have >3 drinks per day or >7 drinks per week? No    Do you usually eat at least 4 servings of fruit and vegetables a day, include whole grains & fiber and avoid regularly eating high fat or \"junk\" foods? Yes    Do you have any problems taking medications regularly?  No    Do you have any side effects from medications? none    Needs assistance for the following daily activities: no assistance needed    Which of the following safety concerns are present in your home?  none identified     Hearing impairment: No    In the past 6 months, have you been bothered by leaking of urine? yes    Mental Health:    In general, how would you rate your overall mental or emotional health? excellent  PHQ-2 Score:        Depression:  Cut down to 50 mg of sertraline within last month; feeling well- she'd like to continue to wean off.  She will do this as tolerated.  Occasionally needs trazodone to sleep but not very often.  Overall, she is engaging in a lot of community events.  She is learning to weld and make ceramics.  She feels fulfilled and healthy.    Do you feel safe in your environment? Yes    Do you have a Health Care Directive? Yes: Patient states has Advance Directive and will bring in a copy to clinic.    Additional concerns to address?  No    Fall risk:  Fallen 2 or more times in the past year?: No  Any fall with injury in the past year?: No    Cognitive Screenin) Repeat 3 items (Leader, Season, Table)    2) Clock draw: NORMAL  3) 3 item recall: Recalls 3 " objects  Results: 3 items recalled: COGNITIVE IMPAIRMENT LESS LIKELY    Mini-CogTM Copyright YARIEL Villanueva. Licensed by the author for use in [facility]; reprinted with permission ([email or web address]@.[facility]). All rights reserved.      Do you have sleep apnea, excessive snoring or daytime drowsiness?: yes        Reviewed and updated as needed this visit by clinical staff  Allergies  Meds         Reviewed and updated as needed this visit by Provider  Tobacco  Allergies  Meds  Med Hx  Surg Hx  Fam Hx  Soc Hx       Social History     Tobacco Use     Smoking status: Never Smoker     Smokeless tobacco: Never Used   Substance Use Topics     Alcohol use: Yes     Comment: moderate                           Current providers sharing in care for this patient include:   Patient Care Team:  Yadira [place] Isaura Weaver as PCP - General    The following health maintenance items are reviewed in Epic and correct as of today:  Health Maintenance   Topic Date Due     DTAP/TDAP/TD IMMUNIZATION (1 - Tdap) 04/18/1960     DEPRESSION ACTION PLAN  04/18/1971     HIV SCREEN (SYSTEM ASSIGNED)  04/18/1971     MAMMO SCREEN Q2 YR (SYSTEM ASSIGNED)  04/18/1993     LIPID SCREEN Q5 YR FEMALE (SYSTEM ASSIGNED)  04/18/1998     ZOSTER IMMUNIZATION (1 of 2) 04/18/2003     ADVANCE DIRECTIVE PLANNING Q5 YRS  04/18/2008     FALL RISK ASSESSMENT  04/18/2018     DEXA SCAN SCREENING (SYSTEM ASSIGNED)  04/18/2018     PNEUMOVAX IMMUNIZATION 65+ HIGH/HIGHEST RISK (1 of 2 - PCV13) 04/18/2018     INFLUENZA VACCINE (1) 09/01/2018     PHQ-9 Q6 MONTHS  09/09/2018     COLON CANCER SCREEN (SYSTEM ASSIGNED)  10/01/2026     HEPATITIS C SCREENING  Completed     IPV IMMUNIZATION  Aged Out     MENINGITIS IMMUNIZATION  Aged Out     Labs reviewed in EPIC  BP Readings from Last 3 Encounters:   12/11/18 118/78   03/09/18 134/78    Wt Readings from Last 3 Encounters:   12/11/18 64 kg (141 lb)   03/09/18 61.4 kg (135 lb 6.4 oz)                  Patient Active Problem List    Diagnosis     Hypothyroidism, unspecified type     Depression, unspecified depression type     History of endometriosis     Glaucoma, left eye     Hiatal hernia     History of squamous cell carcinoma     History of goiter     H/O hysterectomy for benign disease     Age-related nuclear cataract of both eyes     History of radial keratotomy     Hyperlipidemia LDL goal <130     Diverticulosis of large intestine without hemorrhage     Past Surgical History:   Procedure Laterality Date     AS ESOPHAGOSCOPY, DIAGNOSTIC  10/2016    reflux gastritis andmild antral gastritis, and benign fundic gland polyp of stomach found     COLONOSCOPY  10/2016     ENHANCE LASER REFRACTIVE BILATERAL EXISTING PT IN PARAMETERS       HYSTERECTOMY  1990    has one ovary     THYROIDECTOMY  2006      BREAST BIOPSY CORE NEEDLE RIGHT Right 2017       Social History     Tobacco Use     Smoking status: Never Smoker     Smokeless tobacco: Never Used   Substance Use Topics     Alcohol use: Yes     Comment: moderate     Family History   Problem Relation Age of Onset     Cancer Mother      Cerebrovascular Disease Mother      Cancer Father      Diabetes Maternal Grandmother      Cancer Brother      Cancer Sister      Glaucoma Paternal Grandmother          Current Outpatient Medications   Medication Sig Dispense Refill     azelastine (OPTIVAR) 0.05 % SOLN ophthalmic solution Place 1 drop into both eyes 2 times daily 1 Bottle 11     Calcium Carbonate-Vitamin D (CALCIUM 500/VITAMIN D PO) Take by mouth daily       estradiol (ESTRACE) 0.1 MG/GM cream Place 2 g vaginally twice a week 42.5 g      levothyroxine (SYNTHROID/LEVOTHROID) 88 MCG tablet Take 88 mcg by mouth daily       Misc Natural Products (GLUCOSAMINE CHOND COMPLEX/MSM) TABS Take by mouth daily       sertraline (ZOLOFT) 100 MG tablet Take 0.5 tablets (50 mg) by mouth daily 90 tablet 0     timolol (TIMOPTIC) 0.5 % ophthalmic solution Place 1 drop Into the left eye daily 1 Bottle 11     traZODone  "(DESYREL) 50 MG tablet Take 1 tablet (50 mg) by mouth At Bedtime 30 tablet 3     No Known Allergies  No lab results found.   Pneumonia Vaccine:Adults age 65+ who received their first dose of Pneumovax (PPSV23) prior to age 65 years: Should be given PCV 13 > 1 year after their most recent PPSV23 AND should be given a another dose of PPSV23 > 5 years after their most recent dose of PPSV23  Mammogram Screening: Mammogram Screening: Patient over age 50, mutual decision to screen reflected in health maintenance.    ROS:  Constitutional, HEENT, cardiovascular, pulmonary, GI, , musculoskeletal, neuro, skin, endocrine and psych systems are negative, except as otherwise noted.    OBJECTIVE:   /79   Pulse 72   Temp 97.7  F (36.5  C) (Oral)   Ht 1.74 m (5' 8.5\")   Wt 64 kg (141 lb)   SpO2 97%   BMI 21.13 kg/m   Estimated body mass index is 21.13 kg/m  as calculated from the following:    Height as of this encounter: 1.74 m (5' 8.5\").    Weight as of this encounter: 64 kg (141 lb).  EXAM:   GENERAL: healthy, alert and no distress  EYES: Eyes grossly normal to inspection, PERRL and conjunctivae and sclerae normal  HENT: ear canals and TM's normal, nose and mouth without ulcers or lesions  NECK: no adenopathy, no asymmetry, masses, or scars and thyroid normal to palpation  RESP: lungs clear to auscultation - no rales, rhonchi or wheezes  CV: regular rate and rhythm, normal S1 S2, no S3 or S4, no murmur, click or rub, no peripheral edema and peripheral pulses strong  ABDOMEN: soft, nontender, no hepatosplenomegaly, no masses and bowel sounds normal  MS: no gross musculoskeletal defects noted, no edema  SKIN: no suspicious lesions or rashes  NEURO: Normal strength and tone, mentation intact and speech normal  PSYCH: mentation appears normal, affect normal/bright    Diagnostic Test Results:  No results found for this or any previous visit (from the past 24 hour(s)).    ASSESSMENT / PLAN:   1. Encounter for routine " adult health examination without abnormal findings  Doing well.  Labs as below.  Would like to hold off on DEXA until next year.    - Basic metabolic panel  (Ca, Cl, CO2, Creat, Gluc, K, Na, BUN)    2. Hyperlipidemia LDL goal <130  - Lipid panel reflex to direct LDL Non-fasting    3. Hypothyroidism, unspecified type  - TSH with free T4 reflex    4. Depression, unspecified depression type  Refilled.  See HPI.  Stable.    - traZODone (DESYREL) 50 MG tablet; Take 1 tablet (50 mg) by mouth At Bedtime  Dispense: 30 tablet; Refill: 3  - sertraline (ZOLOFT) 100 MG tablet; Take 0.5 tablets (50 mg) by mouth daily  Dispense: 90 tablet; Refill: 0    5. Visit for screening mammogram  - MA Screening Digital Bilateral; Future    6. Need for Tdap vaccination  - TDAP, IM (10 - 64 YRS) - Adacel    7. Need for zoster vaccine  - ZOSTER VACCINE RECOMBINANT ADJUVANTED IM NJX    8. Need for prophylactic vaccination against Streptococcus pneumoniae (pneumococcus)  - Pneumococcal vaccine 23 valent PPSV23  (Pneumovax) [68677]    9. Need for prophylactic vaccination and inoculation against influenza  - FLU VACCINE, INCREASED ANTIGEN, PRESV FREE, AGE 65+ [07201]  - Vaccine Administration, Initial [08630]    End of Life Planning:  Patient currently has an advanced directive: No.  I have verified the patient's ablity to prepare an advanced directive/make health care decisions.  Literature was provided to assist patient in preparing an advanced directive.    COUNSELING:  Reviewed preventive health counseling, as reflected in patient instructions       Regular exercise       Healthy diet/nutrition       Vision screening       Dental care       Immunizations    Vaccinated for: Influenza, Pneumococcal, TDAP and Zoster             Osteoporosis Prevention/Bone Health    BP Readings from Last 1 Encounters:   12/11/18 132/79     Estimated body mass index is 21.13 kg/m  as calculated from the following:    Height as of this encounter: 1.74 m (5'  "8.5\").    Weight as of this encounter: 64 kg (141 lb).           reports that  has never smoked. she has never used smokeless tobacco.      Appropriate preventive services were discussed with this patient, including applicable screening as appropriate for cardiovascular disease, diabetes, osteopenia/osteoporosis, and glaucoma.  As appropriate for age/gender, discussed screening for colorectal cancer, prostate cancer, breast cancer, and cervical cancer. Checklist reviewing preventive services available has been given to the patient.    Reviewed patients plan of care and provided an AVS. The Basic Care Plan (routine screening as documented in Health Maintenance) for Alma Rosa meets the Care Plan requirement. This Care Plan has been established and reviewed with the Patient.    Counseling Resources:  ATP IV Guidelines  Pooled Cohorts Equation Calculator  Breast Cancer Risk Calculator  FRAX Risk Assessment  ICSI Preventive Guidelines  Dietary Guidelines for Americans, 2010  USDA's MyPlate  ASA Prophylaxis  Lung CA Screening    ANA LAURA Bunch [facility]  "

## 2018-12-11 NOTE — PATIENT INSTRUCTIONS
At [facility], we strive to deliver an exceptional experience to you, every time we see you.  If you receive a survey in the mail, please send us back your thoughts. We really do value your feedback.    Your care team:                            Family Medicine Internal Medicine   MD Marques Mack MD Shantel Branch-Fleming, MD Katya Georgiev PA-C Megan Hill, APRN CNP    Santhosh Robison MD Pediatrics   Robby Bocanegra, CINTIA Zamora, MD Jennifer Oneal APRN CNP   MD Rosaura Arzola MD Deborah Mielke, MD Kasandra Hills, APRN [facility]      Clinic hours: Monday - Thursday 7 am-7 pm; Fridays 7 am-5 pm.   Urgent care: Monday - Friday 11 am-9 pm; Saturday and Sunday 9 am-5 pm.  Pharmacy : Monday -Thursday 8 am-8 pm; Friday 8 am-6 pm; Saturday and Sunday 9 am-5 pm.     Clinic: [phone]   Pharmacy: [phone]        Preventive Health Recommendations    See your health care provider every year to    Review health changes.     Discuss preventive care.      Review your medicines if your doctor has prescribed any.      You no longer need a yearly Pap test unless you've had an abnormal Pap test in the past 10 years. If you have vaginal symptoms, such as bleeding or discharge, be sure to talk with your provider about a Pap test.      Every 1 to 2 years, have a mammogram.  If you are over 69, talk with your health care provider about whether or not you want to continue having screening mammograms.      Every 10 years, have a colonoscopy. Or, have a yearly FIT test (stool test). These exams will check for colon cancer.       Have a cholesterol test every 5 years, or more often if your doctor advises it.       Have a diabetes test (fasting glucose) every three years. If you are at risk for diabetes, you should have this test more often.       At age 65, have a bone density scan (DEXA) to check for osteoporosis (brittle bone disease).    Shots:    Get a flu  shot each year.    Get a tetanus shot every 10 years.    Talk to your doctor about your pneumonia vaccines. There are now two you should receive - Pneumovax (PPSV 23) and Prevnar (PCV 13).    Talk to your pharmacist about the shingles vaccine.    Talk to your doctor about the hepatitis B vaccine.    Nutrition:     Eat at least 5 servings of fruits and vegetables each day.      Eat whole-grain bread, whole-wheat pasta and brown rice instead of white grains and rice.      Get adequate Calcium and Vitamin D.     Lifestyle    Exercise at least 150 minutes a week (30 minutes a day, 5 days a week). This will help you control your weight and prevent disease.      Limit alcohol to one drink per day.      No smoking.       Wear sunscreen to prevent skin cancer.       See your dentist twice a year for an exam and cleaning.      See your eye doctor every 1 to 2 years to screen for conditions such as glaucoma, macular degeneration and cataracts.    Personalized Prevention Plan  You are due for the preventive services outlined below.  Your care team is available to assist you in scheduling these services.  If you have already completed any of these items, please share that information with your care team to update in your medical record.  Health Maintenance Due   Topic Date Due     Diptheria Tetanus Pertussis (DTAP/TDAP/TD) Vaccine (1 - Tdap) 04/18/1960     Depression Action Plan Review  04/18/1971     HIV SCREEN (SYSTEM ASSIGNED)  04/18/1971     Mammogram - every 2 years  04/18/1993     Cholesterol Lab - every 5 years  04/18/1998     Zoster (Chicken Pox) Vaccine (1 of 2) 04/18/2003     Discuss Advance Directive Planning  04/18/2008     FALL RISK ASSESSMENT  04/18/2018     Bone Density Screening (Dexa)  04/18/2018     Pneumococcal Vaccine (1 of 2 - PCV13) 04/18/2018     Flu Vaccine (1) 09/01/2018     Depression Assessment - every 6 months  09/09/2018

## 2018-12-12 DIAGNOSIS — E03.9 HYPOTHYROIDISM, UNSPECIFIED TYPE: Primary | ICD-10-CM

## 2018-12-12 RX ORDER — LEVOTHYROXINE SODIUM 100 UG/1
100 TABLET ORAL DAILY
Qty: 90 TABLET | Refills: 0 | Status: SHIPPED | OUTPATIENT
Start: 2018-12-12 | End: 2019-03-20

## 2018-12-12 ASSESSMENT — ANXIETY QUESTIONNAIRES: GAD7 TOTAL SCORE: 3

## 2019-03-15 ENCOUNTER — MYC MEDICAL ADVICE (OUTPATIENT)
Dept: FAMILY MEDICINE | Facility: CLINIC | Age: 64
End: 2019-03-15

## 2019-03-15 DIAGNOSIS — E78.5 HYPERLIPIDEMIA LDL GOAL <130: Primary | ICD-10-CM

## 2019-03-17 NOTE — TELEPHONE ENCOUNTER
Orders are already in.  Please assist patient in making ancillary and lab appointments. Thanks!  [place]

## 2019-03-18 ENCOUNTER — ALLIED HEALTH/NURSE VISIT (OUTPATIENT)
Dept: NURSING | Facility: CLINIC | Age: 64
End: 2019-03-18
Payer: COMMERCIAL

## 2019-03-18 DIAGNOSIS — Z09 NEED FOR IMMUNIZATION FOLLOW-UP: Primary | ICD-10-CM

## 2019-03-18 DIAGNOSIS — E78.5 HYPERLIPIDEMIA LDL GOAL <130: ICD-10-CM

## 2019-03-18 DIAGNOSIS — E03.9 HYPOTHYROIDISM, UNSPECIFIED TYPE: ICD-10-CM

## 2019-03-18 LAB
CHOLEST SERPL-MCNC: 226 MG/DL
HDLC SERPL-MCNC: 52 MG/DL
LDLC SERPL CALC-MCNC: 135 MG/DL
NONHDLC SERPL-MCNC: 174 MG/DL
T4 FREE SERPL-MCNC: 1.05 NG/DL (ref 0.76–1.46)
TRIGL SERPL-MCNC: 197 MG/DL
TSH SERPL DL<=0.005 MIU/L-ACNC: 5.72 MU/L (ref 0.4–4)

## 2019-03-18 PROCEDURE — 80061 LIPID PANEL: CPT | Performed by: NURSE PRACTITIONER

## 2019-03-18 PROCEDURE — 36415 COLL VENOUS BLD VENIPUNCTURE: CPT | Performed by: NURSE PRACTITIONER

## 2019-03-18 PROCEDURE — 84443 ASSAY THYROID STIM HORMONE: CPT | Performed by: NURSE PRACTITIONER

## 2019-03-18 PROCEDURE — 90471 IMMUNIZATION ADMIN: CPT

## 2019-03-18 PROCEDURE — 99207 ZZC NO CHARGE NURSE ONLY: CPT

## 2019-03-18 PROCEDURE — 84439 ASSAY OF FREE THYROXINE: CPT | Performed by: NURSE PRACTITIONER

## 2019-03-18 PROCEDURE — 90750 HZV VACC RECOMBINANT IM: CPT

## 2019-03-18 NOTE — PROGRESS NOTES
Screening Questionnaire for Zostavax Vaccine/Shingrix Vaccine.     1.  Has the patient received the information for the Zostavax vaccine? YES    2.  Does the patient have any of the following contraindications?     Allergy to Neomycin or Gelatin? No    Do you have a weakened immune system because of the current:   A. AIDS or another disease that affect the immune system? NO     B. Receives treatment with drugs that affects the immune system, such as, prolonged use of high dose steroids? NO     C. Cancer treatment such as radiation or chemotherapy , cancer affecting the bone marrow or lymphatic system, such as leukemia or lymphoma? NO     D. Are you pregnant? Women should not become pregnant until at least 4 weeks after getting shingles vacccine. NO     E. Are you moderately or severely ill today or do you have a fever of 101.3 or higher? NO   If temp > 99.0 degrees orally or patient has above allergies, vaccine is deferred    Immunization questionnaire answers were all negative.      Per orders of DEVIN Zamora, injection of Shingrix given by Kalpana Brooke.The vaccine has been administered in the usual fashion and the patient was instructed to wait 15 minutes before leaving the building in the event of an allergic reaction.    2nd shot    Vaccination given by and recorded by Kalpana Brooke March 18, 2019

## 2019-03-18 NOTE — TELEPHONE ENCOUNTER
Switched patient's office visit with [place] to an ancillary appt at 10:40 and a lab only appt at 11:00.  Tahira Kincaid MA/  For [employer] and Becca

## 2019-03-20 ENCOUNTER — MYC REFILL (OUTPATIENT)
Dept: FAMILY MEDICINE | Facility: CLINIC | Age: 64
End: 2019-03-20

## 2019-03-20 DIAGNOSIS — E03.9 HYPOTHYROIDISM, UNSPECIFIED TYPE: ICD-10-CM

## 2019-03-20 RX ORDER — LEVOTHYROXINE SODIUM 100 UG/1
100 TABLET ORAL DAILY
Qty: 90 TABLET | Refills: 0 | Status: CANCELLED | OUTPATIENT
Start: 2019-03-20

## 2019-03-20 RX ORDER — LEVOTHYROXINE SODIUM 100 UG/1
100 TABLET ORAL DAILY
Qty: 90 TABLET | Refills: 0 | Status: SHIPPED | OUTPATIENT
Start: 2019-03-20 | End: 2019-05-14

## 2019-03-20 NOTE — TELEPHONE ENCOUNTER
Patient requesting medication be sent to [facility] in [place]. Medication sent.       Dana Mir RN, BSN

## 2019-03-22 NOTE — TELEPHONE ENCOUNTER
Outpatient Medication Detail      Disp Refills Start End RA   levothyroxine (SYNTHROID/LEVOTHROID) 100 MCG tablet 90 tablet 0 3/20/2019  No   Sig - Route: Take 1 tablet (100 mcg) by mouth daily - Oral   Sent to pharmacy as: levothyroxine (SYNTHROID/LEVOTHROID) 100 MCG tablet   Class: E-Prescribe   Order: [id number]   E-Prescribing Status: Receipt confirmed by pharmacy (3/20/2019  4:53 PM CDT)   Printout Tracking     External Result Report   Pharmacy     [id number] - [place], [place] - [address]     Rx filled on 3/20/19. Closing duplicate encounter.     Alicia Maria RN

## 2019-05-11 DIAGNOSIS — E03.9 HYPOTHYROIDISM, UNSPECIFIED TYPE: ICD-10-CM

## 2019-05-11 PROCEDURE — 36415 COLL VENOUS BLD VENIPUNCTURE: CPT | Performed by: NURSE PRACTITIONER

## 2019-05-11 PROCEDURE — 84439 ASSAY OF FREE THYROXINE: CPT | Performed by: NURSE PRACTITIONER

## 2019-05-11 PROCEDURE — 84443 ASSAY THYROID STIM HORMONE: CPT | Performed by: NURSE PRACTITIONER

## 2019-05-13 ENCOUNTER — MYC MEDICAL ADVICE (OUTPATIENT)
Dept: FAMILY MEDICINE | Facility: CLINIC | Age: 64
End: 2019-05-13

## 2019-05-13 DIAGNOSIS — E03.9 HYPOTHYROIDISM, UNSPECIFIED TYPE: ICD-10-CM

## 2019-05-13 LAB
T4 FREE SERPL-MCNC: 1.64 NG/DL (ref 0.76–1.46)
TSH SERPL DL<=0.005 MIU/L-ACNC: 0.1 MU/L (ref 0.4–4)

## 2019-05-14 RX ORDER — LEVOTHYROXINE SODIUM 88 UG/1
88 TABLET ORAL DAILY
Qty: 90 TABLET | Refills: 1 | Status: SHIPPED | OUTPATIENT
Start: 2019-05-14 | End: 2019-08-09

## 2019-06-18 ENCOUNTER — OFFICE VISIT (OUTPATIENT)
Dept: OPTOMETRY | Facility: CLINIC | Age: 64
End: 2019-06-18
Payer: COMMERCIAL

## 2019-06-18 DIAGNOSIS — H52.223 REGULAR ASTIGMATISM OF BOTH EYES: ICD-10-CM

## 2019-06-18 DIAGNOSIS — H40.1121 PRIMARY OPEN ANGLE GLAUCOMA OF LEFT EYE, MILD STAGE: Primary | ICD-10-CM

## 2019-06-18 DIAGNOSIS — H52.03 HYPERMETROPIA OF BOTH EYES: ICD-10-CM

## 2019-06-18 DIAGNOSIS — H02.889 MEIBOMIAN GLAND DYSFUNCTION: ICD-10-CM

## 2019-06-18 DIAGNOSIS — H25.13 AGE-RELATED NUCLEAR CATARACT OF BOTH EYES: ICD-10-CM

## 2019-06-18 DIAGNOSIS — H52.4 PRESBYOPIA: ICD-10-CM

## 2019-06-18 DIAGNOSIS — Z98.890 HISTORY OF RADIAL KERATOTOMY: ICD-10-CM

## 2019-06-18 PROCEDURE — 92014 COMPRE OPH EXAM EST PT 1/>: CPT | Performed by: OPTOMETRIST

## 2019-06-18 PROCEDURE — 92015 DETERMINE REFRACTIVE STATE: CPT | Performed by: OPTOMETRIST

## 2019-06-18 RX ORDER — TIMOLOL MALEATE 5 MG/ML
1 SOLUTION/ DROPS OPHTHALMIC DAILY
Qty: 1 BOTTLE | Refills: 11 | Status: SHIPPED | OUTPATIENT
Start: 2019-06-18 | End: 2019-07-29

## 2019-06-18 RX ORDER — AZELASTINE HYDROCHLORIDE 0.5 MG/ML
1 SOLUTION/ DROPS OPHTHALMIC 2 TIMES DAILY
Qty: 1 BOTTLE | Refills: 11 | Status: CANCELLED | OUTPATIENT
Start: 2019-06-18

## 2019-06-18 ASSESSMENT — REFRACTION_MANIFEST
OD_SPHERE: +2.25
OD_AXIS: 080
OD_ADD: +2.75
OD_CYLINDER: +0.75
OS_SPHERE: +2.50
OS_CYLINDER: +0.50
OS_AXIS: 070
OS_ADD: +2.75

## 2019-06-18 ASSESSMENT — CUP TO DISC RATIO
OS_RATIO: 0.25
OD_RATIO: 0.2

## 2019-06-18 ASSESSMENT — CONF VISUAL FIELD
OS_NORMAL: 1
OD_NORMAL: 1

## 2019-06-18 ASSESSMENT — VISUAL ACUITY
OD_CC: 20/30-1
OD_SC: 20/150
OS_SC: 20/200
OS_CC: 20/20
OS_CC: 20/20
OD_CC: 20/20
METHOD: SNELLEN - LINEAR
CORRECTION_TYPE: GLASSES

## 2019-06-18 ASSESSMENT — TONOMETRY
IOP_METHOD: TONOPEN
OS_IOP_MMHG: 20
OD_IOP_MMHG: 19

## 2019-06-18 ASSESSMENT — REFRACTION_WEARINGRX
OD_AXIS: 080
OD_SPHERE: +2.25
OS_AXIS: 060
OD_ADD: +2.75
OS_ADD: +2.75
OD_CYLINDER: +0.50
OD_AXIS: 080
OD_ADD: +2.75
OD_CYLINDER: +0.75
OS_CYLINDER: +0.50
OS_CYLINDER: +0.75
SPECS_TYPE: PAL
OS_SPHERE: +2.50
OS_ADD: +2.75
OS_SPHERE: +2.50
OD_SPHERE: +2.25
OS_AXIS: 070

## 2019-06-18 ASSESSMENT — SLIT LAMP EXAM - LIDS
COMMENTS: NORMAL
COMMENTS: NORMAL

## 2019-06-18 ASSESSMENT — EXTERNAL EXAM - RIGHT EYE: OD_EXAM: NORMAL

## 2019-06-18 ASSESSMENT — EXTERNAL EXAM - LEFT EYE: OS_EXAM: NORMAL

## 2019-06-18 NOTE — PROGRESS NOTES
Chief Complaint   Patient presents with     Annual Eye Exam     IOP check        Notes from 3/19/18 exam- Moved from [place]  Was being followed for glaucoma left eye only.  IOPs were as high as low 30s- Timolol- 5 % was most recently used with success.        Last Eye Exam: 3-  Dilated Previously: Yes    What are you currently using to see?  glasses       Distance Vision Acuity: Satisfied with vision    Near Vision Acuity: Satisfied with vision while reading  with glasses    Eye Comfort: sometimes wakes up in middle of night and cant open od eye maybe due to dryness, goes back to sleep and is ok when wakes up in am   Do you use eye drops? : Yes: timolol  Occupation or Hobbies: retired    Brenda Hanson Optometric Assistant, A.B.O.C.          Medical, surgical and family histories reviewed and updated 6/18/2019.       OBJECTIVE: See Ophthalmology exam    ASSESSMENT:    ICD-10-CM    1. Primary open angle glaucoma of left eye, mild stage H40.1121 EYE EXAM (SIMPLE-NONBILLABLE)     timolol maleate (TIMOPTIC) 0.5 % ophthalmic solution     OPHTHALMOLOGY ADULT REFERRAL   2. Age-related nuclear cataract of both eyes H25.13 EYE EXAM (SIMPLE-NONBILLABLE)   3. History of radial keratotomy Z98.890 EYE EXAM (SIMPLE-NONBILLABLE)   4. Meibomian gland dysfunction H02.889 EYE EXAM (SIMPLE-NONBILLABLE)   5. Hypermetropia of both eyes H52.03 REFRACTION   6. Regular astigmatism of both eyes H52.223 REFRACTION   7. Presbyopia H52.4 REFRACTION      PLAN:     Patient Instructions   Continue Timoptic- 1 drop left eye daily.  Referral to Dr. Barrera at [facility] for follow up glaucoma testing/ OCT and visual field.    You have the start of mild cataracts.  You may notice some blurred vision or glare with night driving.  It is important that you wear good sunglasses to protect your eyes from the ultraviolet light from the sun.  Taking a supplement with at least 300 mg/day of vitamin C appears to help  prevent cataract development.  I recommend that you return in 1 year for an eye exam unless there are any sudden changes in your vision.       Systane Balance- 1 drop both eyes 2-4 x day.  Blephadex eyelid wipes- cleanse eyelids 2 x day for 6 weeks then nightly.  These can be purchased on [employer]    Eyeglass prescription given.  No change in eyeglass prescription.    Return in 1 year for a complete eye exam or sooner if needed.    Héctor Padron, OD

## 2019-06-18 NOTE — PATIENT INSTRUCTIONS
Continue Timoptic- 1 drop left eye daily.  Referral to Dr. Barrera at [facility] for follow up glaucoma testing/ OCT and visual field.    You have the start of mild cataracts.  You may notice some blurred vision or glare with night driving.  It is important that you wear good sunglasses to protect your eyes from the ultraviolet light from the sun.  Taking a supplement with at least 300 mg/day of vitamin C appears to help prevent cataract development.  I recommend that you return in 1 year for an eye exam unless there are any sudden changes in your vision.       Systane Balance- 1 drop both eyes 2-4 x day.  Blephadex eyelid wipes- cleanse eyelids 2 x day for 6 weeks then nightly.  These can be purchased on [employer]    Eyeglass prescription given.  No change in eyeglass prescription.    Return in 1 year for a complete eye exam or sooner if needed.    Héctor Padron, OD    The affects of the dilating drops last for 4- 6 hours.  You will be more sensitive to light and vision will be blurry up close.  Mydriatic sunglasses were given if needed.    Use one drop of artificial tears both eyes 3-4 x daily.  Continue to use the drops regardless if your eyes are comfortable.  Artificial tears work best as a preventative and not as well after your eyes are starting to bother you.  It may take 4- 6 weeks of using the drops before you notice improvement.  If after that time you are still having problems schedule an appointment for an evaluation and discussion of different treatments such as Restasis or Xiidra.  Dry eyes are a chronic condition and you may have more symptoms at certain times of the year.    Excess tearing can be due to the right tears not working properly or a blockage in the tear drainage system.  You can try using artificial tears 1 drop right eye 3-4 x day.  If the excess tearing is bothersome after 3-4 weeks and that doesn't help we can send you for further testing on the puncta which allows  the tears to drain.  This would entail a referral to our oculo plastic specialist at the [facility].    Brands of drops that are recommended are:    Systane Complete  Systane Ultra  Systane Balance  Refresh Advanced Optive  Blink    If you are using drops more than 4 x day or have sensitivities to preservatives I recommend non preserved artificial tears.  These come in 1 use vials.  They can be used every 1-2 hours.    Brands of drops that are recommended are:    Systane- preservative free  Refresh-  preservative free  Blink- preservative free    Gels or ointment can be used at night.    Brands recommended are:    Systane Gel  Refresh Gel  Blink Gel  Genteal Gel    Systane night time (ointment)  Refresh Celluvisc  Refresh PM (ointment)      Visine, Clear Eyes or Murine (drops that get the red out) can irritate the eyes and cause a rebound effect where the eyes become more red and you end up using more drops.  Avoid drops containing tetrahydrozoline, naphazoline, phenylephrine, oxymetazoline.      OTC Lumify is a newer product that gives immediate redness relief with out the rebound effect.  Use as needed to take the redness out.    Artificial tears may be used with other drops (such as allergy, glaucoma, antibiotics) around the same time.  Be sure to wait 5 minutes in between drops.    Heat to the eyelids can also improve your symptoms of dry eyes.  Tri heat masks can be purchased at [employer] to be used daily for 10-15 minutes.  Other options are gel masks that can be put in the microwave and purchased at most pharmacies.    Blephadex eyelid wipes- cleanse eyelids 2 x day for 6 weeks then nightly.  These can be purchased on [employer]

## 2019-06-18 NOTE — LETTER
6/18/2019         RE: Alma Rosa Bhat  [address]  [place] [address]        Dear Colleague,    Thank you for referring your patient, Alma Rosa Bhat, to the [facility]. Please see a copy of my visit note below.    Chief Complaint   Patient presents with     Annual Eye Exam     IOP check        Notes from 3/19/18 exam- Moved from [place]  Was being followed for glaucoma left eye only.  IOPs were as high as low 30s- Timolol- 5 % was most recently used with success.        Last Eye Exam: 3-  Dilated Previously: Yes    What are you currently using to see?  glasses       Distance Vision Acuity: Satisfied with vision    Near Vision Acuity: Satisfied with vision while reading  with glasses    Eye Comfort: sometimes wakes up in middle of night and cant open od eye maybe due to dryness, goes back to sleep and is ok when wakes up in am   Do you use eye drops? : Yes: timolol  Occupation or Hobbies: retired    Brenda Hanson Optometric Assistant, A.B.O.C.          Medical, surgical and family histories reviewed and updated 6/18/2019.       OBJECTIVE: See Ophthalmology exam    ASSESSMENT:    ICD-10-CM    1. Primary open angle glaucoma of left eye, mild stage H40.1121 EYE EXAM (SIMPLE-NONBILLABLE)     timolol maleate (TIMOPTIC) 0.5 % ophthalmic solution     OPHTHALMOLOGY ADULT REFERRAL   2. Age-related nuclear cataract of both eyes H25.13 EYE EXAM (SIMPLE-NONBILLABLE)   3. History of radial keratotomy Z98.890 EYE EXAM (SIMPLE-NONBILLABLE)   4. Meibomian gland dysfunction H02.889 EYE EXAM (SIMPLE-NONBILLABLE)   5. Hypermetropia of both eyes H52.03 REFRACTION   6. Regular astigmatism of both eyes H52.223 REFRACTION   7. Presbyopia H52.4 REFRACTION      PLAN:     Patient Instructions   Continue Timoptic- 1 drop left eye daily.  Referral to Dr. Barrera at [facility] for follow up glaucoma testing/ OCT and visual field.    You have the start of mild  cataracts.  You may notice some blurred vision or glare with night driving.  It is important that you wear good sunglasses to protect your eyes from the ultraviolet light from the sun.  Taking a supplement with at least 300 mg/day of vitamin C appears to help prevent cataract development.  I recommend that you return in 1 year for an eye exam unless there are any sudden changes in your vision.       Systane Balance- 1 drop both eyes 2-4 x day.  Blephadex eyelid wipes- cleanse eyelids 2 x day for 6 weeks then nightly.  These can be purchased on [employer]    Eyeglass prescription given.  No change in eyeglass prescription.    Return in 1 year for a complete eye exam or sooner if needed.    Héctor Padron, OD                   Again, thank you for allowing me to participate in the care of your patient.        Sincerely,        Héctor Padron, OD

## 2019-07-24 ENCOUNTER — TELEPHONE (OUTPATIENT)
Dept: OPHTHALMOLOGY | Facility: CLINIC | Age: 64
End: 2019-07-24

## 2019-07-24 NOTE — TELEPHONE ENCOUNTER
[facility] Call Center    Phone Message    May a detailed message be left on voicemail: yes    Reason for Call: Other: Patient would like to know if her appt requires dialation or not. Please advise.     Action Taken: Message routed to:  Adult Clinics: Eye p [phone]

## 2019-07-25 NOTE — TELEPHONE ENCOUNTER
Spoke with patient regarding her upcoming appointment with Dr Barrera for Glaucoma Suspect eval. Reviewed what to expect during the appointment and explained why dilation is necessary.  Advised patient to bring sunglasses. Pt recently had dilated exam with Dr Padron and I explained that we will use the same dilation drops during her upcoming appointment.    Rhiannon Escalante, COA

## 2019-07-29 ENCOUNTER — OFFICE VISIT (OUTPATIENT)
Dept: OPHTHALMOLOGY | Facility: CLINIC | Age: 64
End: 2019-07-29
Attending: OPTOMETRIST
Payer: COMMERCIAL

## 2019-07-29 DIAGNOSIS — H40.1121 PRIMARY OPEN ANGLE GLAUCOMA OF LEFT EYE, MILD STAGE: ICD-10-CM

## 2019-07-29 PROCEDURE — 92133 CPTRZD OPH DX IMG PST SGM ON: CPT | Performed by: OPHTHALMOLOGY

## 2019-07-29 PROCEDURE — 92014 COMPRE OPH EXAM EST PT 1/>: CPT | Performed by: OPHTHALMOLOGY

## 2019-07-29 PROCEDURE — 92083 EXTENDED VISUAL FIELD XM: CPT | Performed by: OPHTHALMOLOGY

## 2019-07-29 RX ORDER — TIMOLOL MALEATE 5 MG/ML
1 SOLUTION/ DROPS OPHTHALMIC DAILY
Qty: 1 BOTTLE | Refills: 11 | Status: SHIPPED | OUTPATIENT
Start: 2019-07-29 | End: 2019-08-09

## 2019-07-29 ASSESSMENT — CUP TO DISC RATIO
OS_RATIO: 0.25
OD_RATIO: 0.2

## 2019-07-29 ASSESSMENT — VISUAL ACUITY
OS_CC: 20/15
OD_CC: 20/15
METHOD: SNELLEN - LINEAR
CORRECTION_TYPE: GLASSES

## 2019-07-29 ASSESSMENT — EXTERNAL EXAM - LEFT EYE: OS_EXAM: NORMAL

## 2019-07-29 ASSESSMENT — SLIT LAMP EXAM - LIDS
COMMENTS: NORMAL
COMMENTS: NORMAL

## 2019-07-29 ASSESSMENT — TONOMETRY
IOP_METHOD: ICARE
OD_IOP_MMHG: 19
OS_IOP_MMHG: 17

## 2019-07-29 ASSESSMENT — EXTERNAL EXAM - RIGHT EYE: OD_EXAM: NORMAL

## 2019-07-29 NOTE — NURSING NOTE
Patient presents with:  Glaucoma Evaluation: Moved from [place]  Was being followed for glaucoma left eye only.  IOPs were as high as low 30s- pt currently using Timolol- 5 % bid OS with regular compliance      Referring Provider:  Hé[place]ron, OD  [address] [place] [address]        Rhiannon Escalante, COA

## 2019-07-29 NOTE — PROGRESS NOTES
Assessment & Plan   Alma Rosa Bhat is a 64 year old female who presents with:   Review of systems for the eyes was negative other than the pertinent positives and negatives noted in the HPI.    Primary open angle glaucoma of left eye, mild stage  - h/o OHTN (reportedly in the 30's) left eye  - OCT Optic Nerve RNFL Optovue OU (both eyes)  - HVF 24-2 OU  - timolol maleate (TIMOPTIC) 0.5 % ophthalmic solution every day left eye     Return in 12 months for annual exam (glaucoma testing)      Attending Physician Attestation:  Complete documentation of historical and exam elements from today's encounter can be found in the full encounter summary report (not reduplicated in this progress note).  I personally obtained the chief complaint(s) and history of present illness.  I confirmed and edited as necessary the review of systems, past medical/surgical history, family history, social history, and examination findings as documented by others; and I examined the patient myself.  I personally reviewed the relevant tests, images, and reports as documented above.  I formulated and edited as necessary the assessment and plan and discussed the findings and management plan with the patient and family. - Uriel Barrera MD

## 2019-08-08 ENCOUNTER — MYC REFILL (OUTPATIENT)
Dept: FAMILY MEDICINE | Facility: CLINIC | Age: 64
End: 2019-08-08

## 2019-08-08 DIAGNOSIS — E03.9 HYPOTHYROIDISM, UNSPECIFIED TYPE: ICD-10-CM

## 2019-08-08 NOTE — TELEPHONE ENCOUNTER
"Requested Prescriptions   Pending Prescriptions Disp Refills     levothyroxine (SYNTHROID/LEVOTHROID) 88 MCG tablet 90 tablet 1     Sig: Take 1 tablet (88 mcg) by mouth daily         Last Written Prescription Date:  5/14/19  Last Fill Quantity: 90,  # refills: 1   Last Office Visit with [facility], [facility] or [facility] prescribing provider:  12/11/18   Future Office Visit:         Thyroid Protocol Failed - 8/8/2019 11:22 AM        Failed - Normal TSH on file in past 12 months     Recent Labs   Lab Test 05/11/19  1114   TSH 0.10*              Passed - Patient is 12 years or older        Passed - Recent (12 mo) or future (30 days) visit within the authorizing provider's specialty     Patient had office visit in the last 12 months or has a visit in the next 30 days with authorizing provider or within the authorizing provider's specialty.  See \"Patient Info\" tab in inbasket, or \"Choose Columns\" in Meds & Orders section of the refill encounter.              Passed - Medication is active on med list        Passed - No active pregnancy on record     If patient is pregnant or has had a positive pregnancy test, please check TSH.          Passed - No positive pregnancy test in past 12 months     If patient is pregnant or has had a positive pregnancy test, please check TSH.                Mj Faarax  [facility]  "

## 2019-08-09 ENCOUNTER — MYC REFILL (OUTPATIENT)
Dept: FAMILY MEDICINE | Facility: CLINIC | Age: 64
End: 2019-08-09

## 2019-08-09 DIAGNOSIS — H40.1121 PRIMARY OPEN ANGLE GLAUCOMA OF LEFT EYE, MILD STAGE: ICD-10-CM

## 2019-08-09 DIAGNOSIS — E03.9 HYPOTHYROIDISM, UNSPECIFIED TYPE: ICD-10-CM

## 2019-08-09 RX ORDER — LEVOTHYROXINE SODIUM 88 UG/1
88 TABLET ORAL DAILY
Qty: 90 TABLET | Refills: 1 | OUTPATIENT
Start: 2019-08-09

## 2019-08-09 RX ORDER — TIMOLOL MALEATE 5 MG/ML
1 SOLUTION/ DROPS OPHTHALMIC DAILY
Qty: 5 ML | Refills: 3 | Status: SHIPPED | OUTPATIENT
Start: 2019-08-09 | End: 2019-10-30

## 2019-08-09 NOTE — TELEPHONE ENCOUNTER
"Requested Prescriptions   Pending Prescriptions Disp Refills     levothyroxine (SYNTHROID/LEVOTHROID) 88 MCG tablet 90 tablet 1     Sig: Take 1 tablet (88 mcg) by mouth daily       Last Written Prescription Date:  5/14/19  Last Fill Quantity: 90,  # refills: 1   Last Office Visit with [facility], P or [facility] prescribing provider:  12/11/18   Future Office Visit:         Thyroid Protocol Failed - 8/9/2019  4:50 PM        Failed - Normal TSH on file in past 12 months     Recent Labs   Lab Test 05/11/19  1114   TSH 0.10*              Passed - Patient is 12 years or older        Passed - Recent (12 mo) or future (30 days) visit within the authorizing provider's specialty     Patient had office visit in the last 12 months or has a visit in the next 30 days with authorizing provider or within the authorizing provider's specialty.  See \"Patient Info\" tab in inbasket, or \"Choose Columns\" in Meds & Orders section of the refill encounter.              Passed - Medication is active on med list        Passed - No active pregnancy on record     If patient is pregnant or has had a positive pregnancy test, please check TSH.          Passed - No positive pregnancy test in past 12 months     If patient is pregnant or has had a positive pregnancy test, please check TSH.                Mj Faarax  [facility]  "

## 2019-08-10 ENCOUNTER — TELEPHONE (OUTPATIENT)
Dept: FAMILY MEDICINE | Facility: CLINIC | Age: 64
End: 2019-08-10

## 2019-08-10 ENCOUNTER — NURSE TRIAGE (OUTPATIENT)
Dept: NURSING | Facility: CLINIC | Age: 64
End: 2019-08-10

## 2019-08-10 NOTE — TELEPHONE ENCOUNTER
Please call Alma Rosa about her thyroid lab that needs to be drawn and thyroid medication refill. She is out of thyroid medication. She has already requested this in plenty of time to get it refilled. Please call her Monday morning.  Darlyn Vyas RN-[facility]

## 2019-08-10 NOTE — TELEPHONE ENCOUNTER
Please call Alma Orsa about her thyroid lab and medication refill. She is out of thyroid medication. She has already requested this in plenty of time to get it refilled. Please call her Monday morning.  Epic encounter sent to the patient's clinic. I asked her to contact her pharmacy for them to loan her pills until they get the refill order.    Darlyn Vyas RN-[facility]

## 2019-08-12 DIAGNOSIS — E03.9 HYPOTHYROIDISM, UNSPECIFIED TYPE: ICD-10-CM

## 2019-08-12 LAB
T4 FREE SERPL-MCNC: 1.47 NG/DL (ref 0.76–1.46)
TSH SERPL DL<=0.005 MIU/L-ACNC: 0.1 MU/L (ref 0.4–4)

## 2019-08-12 PROCEDURE — 36415 COLL VENOUS BLD VENIPUNCTURE: CPT | Performed by: NURSE PRACTITIONER

## 2019-08-12 PROCEDURE — 84439 ASSAY OF FREE THYROXINE: CPT | Performed by: NURSE PRACTITIONER

## 2019-08-12 PROCEDURE — 84443 ASSAY THYROID STIM HORMONE: CPT | Performed by: NURSE PRACTITIONER

## 2019-08-12 RX ORDER — LEVOTHYROXINE SODIUM 88 UG/1
88 TABLET ORAL DAILY
Qty: 30 TABLET | Refills: 0 | Status: SHIPPED | OUTPATIENT
Start: 2019-08-12 | End: 2019-08-13

## 2019-08-12 NOTE — TELEPHONE ENCOUNTER
Routing refill request to provider for review/approval because:  Labs out of range:  TSH    Renata Mathews RN, [facility]

## 2019-08-12 NOTE — TELEPHONE ENCOUNTER
Spoke with Pt she said she is coming in today at 11 am for labs.     Kenya Salmeron MA on 8/12/2019 at 9:15 AM

## 2019-08-13 DIAGNOSIS — E03.9 HYPOTHYROIDISM, UNSPECIFIED TYPE: ICD-10-CM

## 2019-08-13 RX ORDER — LEVOTHYROXINE SODIUM 75 UG/1
75 TABLET ORAL DAILY
Qty: 90 TABLET | Refills: 0 | Status: SHIPPED | OUTPATIENT
Start: 2019-08-13 | End: 2019-10-31

## 2019-08-13 NOTE — RESULT ENCOUNTER NOTE
Ms. Bhat,    Your follow-up TSH is abnormal suggesting you are currently overtreated still as your labs only slightly improved.  I suggest we change your medication dose to 75 micrograms/day and recheck your TSH in 6-8 weeks.    Please contact the clinic if you have additional questions.  Thank you.    Sincerely,    Danna Pino

## 2019-09-09 ENCOUNTER — OFFICE VISIT (OUTPATIENT)
Dept: FAMILY MEDICINE | Facility: CLINIC | Age: 64
End: 2019-09-09
Payer: COMMERCIAL

## 2019-09-09 VITALS
DIASTOLIC BLOOD PRESSURE: 77 MMHG | HEIGHT: 69 IN | WEIGHT: 135 LBS | HEART RATE: 68 BPM | SYSTOLIC BLOOD PRESSURE: 137 MMHG | BODY MASS INDEX: 19.99 KG/M2 | TEMPERATURE: 97.9 F | OXYGEN SATURATION: 98 %

## 2019-09-09 DIAGNOSIS — F34.1 DYSTHYMIA: Primary | ICD-10-CM

## 2019-09-09 DIAGNOSIS — F43.21 GRIEF: ICD-10-CM

## 2019-09-09 PROCEDURE — 99213 OFFICE O/P EST LOW 20 MIN: CPT | Performed by: FAMILY MEDICINE

## 2019-09-09 ASSESSMENT — PATIENT HEALTH QUESTIONNAIRE - PHQ9
SUM OF ALL RESPONSES TO PHQ QUESTIONS 1-9: 5
5. POOR APPETITE OR OVEREATING: NOT AT ALL

## 2019-09-09 ASSESSMENT — ANXIETY QUESTIONNAIRES
GAD7 TOTAL SCORE: 2
5. BEING SO RESTLESS THAT IT IS HARD TO SIT STILL: NOT AT ALL
2. NOT BEING ABLE TO STOP OR CONTROL WORRYING: SEVERAL DAYS
6. BECOMING EASILY ANNOYED OR IRRITABLE: SEVERAL DAYS
1. FEELING NERVOUS, ANXIOUS, OR ON EDGE: NOT AT ALL
7. FEELING AFRAID AS IF SOMETHING AWFUL MIGHT HAPPEN: NOT AT ALL
3. WORRYING TOO MUCH ABOUT DIFFERENT THINGS: NOT AT ALL
IF YOU CHECKED OFF ANY PROBLEMS ON THIS QUESTIONNAIRE, HOW DIFFICULT HAVE THESE PROBLEMS MADE IT FOR YOU TO DO YOUR WORK, TAKE CARE OF THINGS AT HOME, OR GET ALONG WITH OTHER PEOPLE: SOMEWHAT DIFFICULT

## 2019-09-09 ASSESSMENT — MIFFLIN-ST. JEOR: SCORE: 1208.8

## 2019-09-09 ASSESSMENT — PAIN SCALES - GENERAL: PAINLEVEL: NO PAIN (0)
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
## 2019-09-09 NOTE — PATIENT INSTRUCTIONS
Patient Education     Understanding Dysthymia    You know something is wrong. You feel tired and sad most of the time. In fact, you don t seem to enjoy life much at all anymore. Nothing you do makes you feel better for very long. If this sounds like you, know that there is hope. You may have a mood disorder called dysthymia. Talk with your healthcare provider about treatments that can help.  What is dysthymia?  Also known as persistent depressive disorder, dysthymia is a mild form of depression that may persist for years. More women than men have dysthymia. It s not known just what causes this disorder. It s not as severe as other types of depression. But it does affect well-being. You may have trouble with work or school. Your relationships with friends and family may suffer. You may miss much of life s beauty and pleasure. If you have this disorder, you likely:    Have been depressed most days for at least 2 years    Have two or more other symptoms of depression  How is it treated?  Your healthcare provider may recommend therapy (counseling), medicines, or both. Just talking to a therapist may be a great relief. Your therapist can help you learn how best to cope with problems. And how to make positive changes in your life. Certain medicines may also be an option. These can help you feel less depressed. Eating a healthy diet and getting plenty of exercise also may help. So will having the support and caring of those closest to you.  Symptoms of depression    Gaining or losing a lot of weight    Sleeping too much or too little    Feeling tired all the time    Feeling restless    Feeling worthless or guilty    Having trouble thinking clearly or making decisions    Feeling hopeless    Moving or speaking more slowly    Thinking about death and suicide   Date Last Reviewed: 5/1/2017 2000-2018 [employer]. [address] [facility], [place] [address]. All rights reserved. This information is not intended  as a substitute for professional medical care. Always follow your healthcare professional's instructions.

## 2019-09-10 ASSESSMENT — ANXIETY QUESTIONNAIRES: GAD7 TOTAL SCORE: 2

## 2019-10-02 ENCOUNTER — HEALTH MAINTENANCE LETTER (OUTPATIENT)
Age: 64
End: 2019-10-02

## 2019-10-29 DIAGNOSIS — E03.9 HYPOTHYROIDISM, UNSPECIFIED TYPE: ICD-10-CM

## 2019-10-29 LAB — TSH SERPL DL<=0.005 MIU/L-ACNC: 1.64 MU/L (ref 0.4–4)

## 2019-10-29 PROCEDURE — 36415 COLL VENOUS BLD VENIPUNCTURE: CPT | Performed by: NURSE PRACTITIONER

## 2019-10-29 PROCEDURE — 84443 ASSAY THYROID STIM HORMONE: CPT | Performed by: NURSE PRACTITIONER

## 2019-10-30 ENCOUNTER — MYC REFILL (OUTPATIENT)
Dept: FAMILY MEDICINE | Facility: CLINIC | Age: 64
End: 2019-10-30

## 2019-10-30 ENCOUNTER — MYC REFILL (OUTPATIENT)
Dept: OTOLARYNGOLOGY | Facility: CLINIC | Age: 64
End: 2019-10-30

## 2019-10-30 DIAGNOSIS — H40.1121 PRIMARY OPEN ANGLE GLAUCOMA OF LEFT EYE, MILD STAGE: ICD-10-CM

## 2019-10-30 DIAGNOSIS — E03.9 HYPOTHYROIDISM, UNSPECIFIED TYPE: ICD-10-CM

## 2019-10-30 DIAGNOSIS — F32.A DEPRESSION, UNSPECIFIED DEPRESSION TYPE: ICD-10-CM

## 2019-10-30 RX ORDER — LEVOTHYROXINE SODIUM 75 UG/1
75 TABLET ORAL DAILY
Qty: 90 TABLET | Refills: 0 | Status: CANCELLED | OUTPATIENT
Start: 2019-10-30

## 2019-10-31 DIAGNOSIS — E03.9 HYPOTHYROIDISM, UNSPECIFIED TYPE: ICD-10-CM

## 2019-10-31 RX ORDER — TIMOLOL MALEATE 5 MG/ML
1 SOLUTION/ DROPS OPHTHALMIC DAILY
Qty: 5 ML | Refills: 3 | Status: SHIPPED | OUTPATIENT
Start: 2019-10-31 | End: 2020-01-23

## 2019-10-31 RX ORDER — LEVOTHYROXINE SODIUM 75 UG/1
75 TABLET ORAL DAILY
Qty: 90 TABLET | Refills: 3 | Status: SHIPPED | OUTPATIENT
Start: 2019-10-31 | End: 2020-01-24

## 2019-10-31 RX ORDER — SERTRALINE HYDROCHLORIDE 100 MG/1
TABLET, FILM COATED ORAL
COMMUNITY
Start: 2019-02-28 | End: 2019-12-18

## 2019-11-04 RX ORDER — TRAZODONE HYDROCHLORIDE 50 MG/1
50 TABLET, FILM COATED ORAL AT BEDTIME
Qty: 30 TABLET | Refills: 3 | Status: SHIPPED | OUTPATIENT
Start: 2019-11-04 | End: 2020-01-24

## 2019-11-04 NOTE — TELEPHONE ENCOUNTER
"Requested Prescriptions   Signed Prescriptions Disp Refills    traZODone (DESYREL) 50 MG tablet 30 tablet 3     Sig: Take 1 tablet (50 mg) by mouth At Bedtime       Serotonin Modulators Passed - 11/4/2019  3:47 PM        Passed - Recent (12 mo) or future (30 days) visit within the authorizing provider's specialty     Patient has had an office visit with the authorizing provider or a provider within the authorizing providers department within the previous 12 mos or has a future within next 30 days. See \"Patient Info\" tab in inbasket, or \"Choose Columns\" in Meds & Orders section of the refill encounter.              Passed - Medication is active on med list        Passed - Patient is age 18 or older        Passed - No active pregnancy on record        Passed - No positive pregnancy test in past 12 months        Prescription approved per [facility] Refill Protocol.        Dana Mir RN, BSN, PHN    "

## 2019-12-03 ENCOUNTER — OFFICE VISIT (OUTPATIENT)
Dept: OPTOMETRY | Facility: CLINIC | Age: 64
End: 2019-12-03
Payer: COMMERCIAL

## 2019-12-03 DIAGNOSIS — H10.13 ALLERGIC CONJUNCTIVITIS OF BOTH EYES: ICD-10-CM

## 2019-12-03 PROCEDURE — 92012 INTRM OPH EXAM EST PATIENT: CPT | Performed by: OPTOMETRIST

## 2019-12-03 RX ORDER — AZELASTINE HYDROCHLORIDE 0.5 MG/ML
1 SOLUTION/ DROPS OPHTHALMIC 2 TIMES DAILY
Qty: 1 BOTTLE | Refills: 11 | Status: CANCELLED | OUTPATIENT
Start: 2019-12-03

## 2019-12-03 ASSESSMENT — TONOMETRY
IOP_METHOD: APPLANATION
OS_IOP_MMHG: 32
OD_IOP_MMHG: 20

## 2019-12-03 ASSESSMENT — SLIT LAMP EXAM - LIDS
COMMENTS: MEIBOMIAN GLAND DYSFUNCTION, COLLARETTES
COMMENTS: MEIBOMIAN GLAND DYSFUNCTION, COLLARETTES

## 2019-12-03 ASSESSMENT — EXTERNAL EXAM - RIGHT EYE: OD_EXAM: NORMAL

## 2019-12-03 ASSESSMENT — VISUAL ACUITY
OS_CC: 20/20
METHOD: SNELLEN - LINEAR
OD_CC: 20/25
CORRECTION_TYPE: GLASSES

## 2019-12-03 ASSESSMENT — EXTERNAL EXAM - LEFT EYE: OS_EXAM: NORMAL

## 2019-12-03 NOTE — LETTER
12/3/2019         RE: Alma Rosa Bhat  [address]  [facility] [address]        Dear Colleague,    Thank you for referring your patient, Alma Rosa Bhat, to the [facility]. Please see a copy of my visit note below.    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   Has stopped glaucoma drops left eye for about 3 weeks.  Prefers not to say why.    Brenda Hanson, Optometric Assistant, A.B.O.C.     Medical, surgical and family histories reviewed and updated 12/3/2019.       OBJECTIVE: See Ophthalmology exam    ASSESSMENT:    ICD-10-CM    1. Allergic conjunctivitis of both eyes H10.13       PLAN:     Patient Instructions   Non preserved Systane or Refresh- 1 drop both eyes every 1-2 hours.    Heat to the eyes daily for 10-15 minutes nightly with warm washcloth or reusable gel masks from the pharmacy or  [employer] heat masks can be purchased at [employer].    Thera tears Sterilid eyelid cleanser.  Spray on a cotton pad and cleanse eyelids and eyelashes with eyes closed at night.  Do not rinse. This can be purchased at [employer] and [employer].    OTC Lumify as needed to take the redness out.  Do not use Visine or Clear Eyes.    OTC Alaway- 1 drop both eyes 2 x day as needed for itchy, watery eyes.    Start up Timoptic left eye again.  Return in 4 weeks for IOP check or sooner if needed.      Héctor Padron, OD                 Again, thank you for allowing me to participate in the care of your patient.        Sincerely,        Héctor Padron, OD

## 2019-12-04 NOTE — PROGRESS NOTES
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   Has stopped glaucoma drops left eye for about 3 weeks.  Prefers not to say why.    Brenda Hanson, Optometric Assistant, A.B.O.C.     Medical, surgical and family histories reviewed and updated 12/3/2019.       OBJECTIVE: See Ophthalmology exam    ASSESSMENT:    ICD-10-CM    1. Allergic conjunctivitis of both eyes H10.13       PLAN:     Patient Instructions   Non preserved Systane or Refresh- 1 drop both eyes every 1-2 hours.    Heat to the eyes daily for 10-15 minutes nightly with warm washcloth or reusable gel masks from the pharmacy or  [employer] heat masks can be purchased at [employer].    Thera tears Sterilid eyelid cleanser.  Spray on a cotton pad and cleanse eyelids and eyelashes with eyes closed at night.  Do not rinse. This can be purchased at [employer] and [employer].    OTC Lumify as needed to take the redness out.  Do not use Visine or Clear Eyes.    OTC Alaway- 1 drop both eyes 2 x day as needed for itchy, watery eyes.    Start up Timoptic left eye again.  Return in 4 weeks for IOP check or sooner if needed.      Héctor Padron, OD

## 2019-12-04 NOTE — PATIENT INSTRUCTIONS
Non preserved Systane or Refresh- 1 drop both eyes every 1-2 hours.    Heat to the eyes daily for 10-15 minutes nightly with warm washcloth or reusable gel masks from the pharmacy or  [employer] heat masks can be purchased at [employer].    Thera tears Sterilid eyelid cleanser.  Spray on a cotton pad and cleanse eyelids and eyelashes with eyes closed at night.  Do not rinse. This can be purchased at [employer] and [employer].    OTC Lumify as needed to take the redness out.  Do not use Visine or Clear Eyes.    OTC Alaway- 1 drop both eyes 2 x day as needed for itchy, watery eyes.    Start up Timoptic left eye again.  Return in 4 weeks for IOP check or sooner if needed.      Héctor Padron, OD

## 2019-12-18 ENCOUNTER — OFFICE VISIT (OUTPATIENT)
Dept: FAMILY MEDICINE | Facility: CLINIC | Age: 64
End: 2019-12-18
Payer: COMMERCIAL

## 2019-12-18 VITALS
WEIGHT: 137 LBS | TEMPERATURE: 97.7 F | HEART RATE: 63 BPM | RESPIRATION RATE: 16 BRPM | DIASTOLIC BLOOD PRESSURE: 82 MMHG | BODY MASS INDEX: 20.29 KG/M2 | OXYGEN SATURATION: 99 % | SYSTOLIC BLOOD PRESSURE: 134 MMHG | HEIGHT: 69 IN

## 2019-12-18 DIAGNOSIS — F43.21 GRIEF: ICD-10-CM

## 2019-12-18 DIAGNOSIS — F34.1 DYSTHYMIA: ICD-10-CM

## 2019-12-18 DIAGNOSIS — Z00.00 ENCOUNTER FOR MEDICARE ANNUAL WELLNESS EXAM: Primary | ICD-10-CM

## 2019-12-18 DIAGNOSIS — R09.82 POSTNASAL DRIP: ICD-10-CM

## 2019-12-18 DIAGNOSIS — Z23 NEED FOR PROPHYLACTIC VACCINATION AGAINST STREPTOCOCCUS PNEUMONIAE (PNEUMOCOCCUS): ICD-10-CM

## 2019-12-18 DIAGNOSIS — Z23 NEED FOR PROPHYLACTIC VACCINATION AND INOCULATION AGAINST INFLUENZA: ICD-10-CM

## 2019-12-18 DIAGNOSIS — E28.39 ESTROGEN DEFICIENCY: ICD-10-CM

## 2019-12-18 PROCEDURE — 90670 PCV13 VACCINE IM: CPT | Performed by: FAMILY MEDICINE

## 2019-12-18 PROCEDURE — G0008 ADMIN INFLUENZA VIRUS VAC: HCPCS | Performed by: FAMILY MEDICINE

## 2019-12-18 PROCEDURE — 99213 OFFICE O/P EST LOW 20 MIN: CPT | Mod: 25 | Performed by: FAMILY MEDICINE

## 2019-12-18 PROCEDURE — G0009 ADMIN PNEUMOCOCCAL VACCINE: HCPCS | Performed by: FAMILY MEDICINE

## 2019-12-18 PROCEDURE — 99397 PER PM REEVAL EST PAT 65+ YR: CPT | Mod: 25 | Performed by: FAMILY MEDICINE

## 2019-12-18 PROCEDURE — 90662 IIV NO PRSV INCREASED AG IM: CPT | Performed by: FAMILY MEDICINE

## 2019-12-18 RX ORDER — FLUTICASONE PROPIONATE 50 MCG
1 SPRAY, SUSPENSION (ML) NASAL DAILY
Qty: 16 G | Refills: 11 | Status: SHIPPED | OUTPATIENT
Start: 2019-12-18 | End: 2020-07-16

## 2019-12-18 ASSESSMENT — MIFFLIN-ST. JEOR: SCORE: 1217.87

## 2019-12-18 NOTE — PROGRESS NOTES
"  SUBJECTIVE:   Alma Rosa Bhat is a 64 year old female who presents for Preventive Visit.  Are you in the first 12 months of your Medicare Part B coverage?  No    Physical Health:    In general, how would you rate your overall physical health? good    Outside of work, how many days during the week do you exercise? 2 days per week    Outside of work, approximately how many minutes a day do you exercise?greater than 60 minutes    If you drink alcohol do you typically have >3 drinks per day or >7 drinks per week? No    Do you usually eat at least 4 servings of fruit and vegetables a day, include whole grains & fiber and avoid regularly eating high fat or \"junk\" foods? Yes    Do you have any problems taking medications regularly?  No    Do you have any side effects from medications? none    Needs assistance for the following daily activities: no assistance needed    Which of the following safety concerns are present in your home?  none identified     Hearing impairment: No    In the past 6 months, have you been bothered by leaking of urine? no    Mental Health:    In general, how would you rate your overall mental or emotional health? Good, but did have a breakup and rehome her dog.  She is going to [employer], journaling and exercising more which is helping quite a lot.  PHQ-2 Score: 0    Do you feel safe in your environment? Yes    Have you ever done Advance Care Planning? (For example, a Health Directive, POLST, or a discussion with a medical provider or your loved ones about your wishes): No, advance care planning information given to patient to review.  Advanced care planning was discussed at today's visit.    Additional concerns to address?  YES    Fall risk:  Fallen 2 or more times in the past year?: No  Any fall with injury in the past year?: No    Cognitive Screenin) Repeat 3 items (Leader, Season, Table)    2) Clock draw: NORMAL  3) 3 item recall: Recalls 3 objects  Results: 3 items recalled: COGNITIVE " IMPAIRMENT LESS LIKELY    Mini-CogTM Copyright YARIEL Villanueva. Licensed by the author for use in [facility]; reprinted with permission (kallie@.[facility]). All rights reserved.      Do you have sleep apnea, excessive snoring or daytime drowsiness?: yes, snoring         Dysthymia/Grief Followup    How are you doing with your depression since your last visit? Improved see above    Are you having other symptoms that might be associated with depression? No    Have you had a significant life event?  Relationship Concerns and Grief or Loss     Are you feeling anxious or having panic attacks?   No    Do you have any concerns with your use of alcohol or other drugs? No    Social History     Tobacco Use     Smoking status: Never Smoker     Smokeless tobacco: Never Used   Substance Use Topics     Alcohol use: Yes     Comment: moderate     Drug use: No     PHQ 3/9/2018 12/11/2018 9/9/2019   PHQ-9 Total Score 3 3 5   Q9: Thoughts of better off dead/self-harm past 2 weeks Not at all Not at all Not at all     PARVIN-7 SCORE 3/9/2018 12/11/2018 9/9/2019   Total Score 4 3 2       Suicide Assessment Five-step Evaluation and Treatment (SAFE-T)      Reviewed and updated as needed this visit by clinical staff  Tobacco  Allergies  Meds  Problems  Med Hx  Surg Hx  Fam Hx  Soc Hx          Reviewed and updated as needed this visit by Provider  Tobacco  Allergies  Meds  Problems  Med Hx  Surg Hx  Fam Hx        Social History     Tobacco Use     Smoking status: Never Smoker     Smokeless tobacco: Never Used   Substance Use Topics     Alcohol use: Yes     Comment: moderate                           Current providers sharing in care for this patient include:   Patient Care Team:  [facility] [place] as PCP - General  Branch Danna Pino MD as Assigned PCP    The following health maintenance items are reviewed in Epic and correct as of today:  Health Maintenance   Topic Date Due     DEXA  1955      "ADVANCE CARE PLANNING  1955     INFLUENZA VACCINE (1) 09/01/2019     MEDICARE ANNUAL WELLNESS VISIT  12/11/2019     PNEUMOCOCCAL IMMUNIZATION 65+ LOW/MEDIUM RISK (2 of 2 - PCV13) 12/11/2019     PHQ-9  03/09/2020     TSH W/FREE T4 REFLEX  10/29/2020     MAMMO SCREENING  12/11/2020     FALL RISK ASSESSMENT  12/18/2020     LIPID  03/18/2024     COLONOSCOPY  10/01/2026     DTAP/TDAP/TD IMMUNIZATION (3 - Td) 12/11/2028     HEPATITIS C SCREENING  Completed     DEPRESSION ACTION PLAN  Completed     ZOSTER IMMUNIZATION  Completed     IPV IMMUNIZATION  Aged Out     MENINGITIS IMMUNIZATION  Aged Out           ROS:  Constitutional, HEENT, cardiovascular, pulmonary, GI, , musculoskeletal, neuro, skin, endocrine and psych systems are negative, except as otherwise noted.    OBJECTIVE:   /82 (BP Location: Left arm, Patient Position: Sitting, Cuff Size: Adult Regular)   Pulse 63   Temp 97.7  F (36.5  C) (Oral)   Resp 16   Ht 1.74 m (5' 8.5\")   Wt 62.1 kg (137 lb)   SpO2 99%   BMI 20.53 kg/m   Estimated body mass index is 20.53 kg/m  as calculated from the following:    Height as of this encounter: 1.74 m (5' 8.5\").    Weight as of this encounter: 62.1 kg (137 lb).  EXAM:   GENERAL: healthy, alert and no distress  EYES: Eyes grossly normal to inspection, PERRL and conjunctivae and sclerae normal  HENT: ear canals and TM's normal, mouth without ulcers or lesions, pale swollen nasal mucosa  NECK: no adenopathy, no asymmetry, masses, or scars and thyroid normal to palpation  RESP: lungs clear to auscultation - no rales, rhonchi or wheezes  CV: regular rate and rhythm, normal S1 S2, no S3 or S4, no murmur, click or rub, no peripheral edema and peripheral pulses strong  ABDOMEN: soft, nontender, no hepatosplenomegaly, no masses and bowel sounds normal  MS: no gross musculoskeletal defects noted, no edema  SKIN: no suspicious lesions or rashes  NEURO: Normal strength and tone, mentation intact and speech " "normal  PSYCH: mentation appears normal, affect normal/bright    Diagnostic Test Results:  Labs reviewed in Epic    ASSESSMENT / PLAN:   1. Encounter for Medicare annual wellness exam  Routine preventive discussed    2. Need for prophylactic vaccination and inoculation against influenza  Vaccine given  - EA ADD'L VACCINE    3. Need for prophylactic vaccination against Streptococcus pneumoniae (pneumococcus)  Vaccine due  - PNEUMOCOCCAL CONJ VACCINE 13 VALENT IM    4. Estrogen deficiency  Screening  - DEXA HIP/PELVIS/SPINE - Future; Future    5. Dysthymia  Referral for therapy  - MENTAL HEALTH REFERRAL  - Adult; Outpatient Treatment; Individual/Couples/Family/Group Therapy/Health Psychology; [facility]: [facility] [phone]; We will contact you to schedule the appointment or please call with any questions    6. Grief  Referral for therapy  - MENTAL HEALTH REFERRAL  - Adult; Outpatient Treatment; Individual/Couples/Family/Group Therapy/Health Psychology; [facility]: [facility] [phone]; We will contact you to schedule the appointment or please call with any questions    7. Postnasal drip  Trial of flonase and see ENT in 2 weeks if not improved.  - OTOLARYNGOLOGY REFERRAL  - fluticasone (FLONASE) 50 MCG/ACT nasal spray; Spray 1 spray into both nostrils daily  Dispense: 16 g; Refill: 11    COUNSELING:  Reviewed preventive health counseling, as reflected in patient instructions    Estimated body mass index is 20.53 kg/m  as calculated from the following:    Height as of this encounter: 1.74 m (5' 8.5\").    Weight as of this encounter: 62.1 kg (137 lb).         reports that she has never smoked. She has never used smokeless tobacco.      Appropriate preventive services were discussed with this patient, including applicable screening as appropriate for cardiovascular disease, diabetes, osteopenia/osteoporosis, and glaucoma.  As appropriate for age/gender, discussed screening for colorectal " cancer, prostate cancer, breast cancer, and cervical cancer. Checklist reviewing preventive services available has been given to the patient.    Reviewed patients plan of care and provided an AVS. The Basic Care Plan (routine screening as documented in Health Maintenance) for Alma Rosa meets the Care Plan requirement. This Care Plan has been established and reviewed with the Patient.    Counseling Resources:  ATP IV Guidelines  Pooled Cohorts Equation Calculator  Breast Cancer Risk Calculator  FRAX Risk Assessment  ICSI Preventive Guidelines  Dietary Guidelines for Americans, 2010  USDA's MyPlate  ASA Prophylaxis  Lung CA Screening    Danna Pino MD  [facility]

## 2019-12-18 NOTE — PATIENT INSTRUCTIONS
At [facility], we strive to deliver an exceptional experience to you, every time we see you. If you receive a survey, please complete it as we do value your feedback.  If you have MyChart, you can expect to receive results automatically within 24 hours of their completion.  Your provider will send a note interpreting your results as well.   If you do not have MyChart, you should receive your results in about a week by mail.    Your care team:     Family Medicine   CINTIA Orellana MD Emily Bunt, ANA LAURA DURON   S. MD Megan Sheth MD Angela Wermerskirchen, MD    Internal Medicine  Anibal Portillo MD coming 2020     Clinic hours: Monday - Wednesday 7 am-7 pm   Thursdays and Fridays 7 am-5 pm.     [facility]: Monday - Friday 11 am-9 pm,   Saturday and Sunday 9 am-5 pm.    [facility]: Monday -Thursday 8 am-8 pm; Friday 8 am-6 pm; Saturday and Sunday 9 am-5 pm.     [facility]: Monday - Thursday 8 am - 7 pm; Friday 8 am - 6 pm    Clinic: [phone]   [facility]: [phone]   [facility]: [phone]            Patient Education   Personalized Prevention Plan  You are due for the preventive services outlined below.  Your care team is available to assist you in scheduling these services.  If you have already completed any of these items, please share that information with your care team to update in your medical record.  Health Maintenance Due   Topic Date Due     Osteoporosis Screening  1955     Discuss Advance Care Planning  1955     Depression Action Plan  1955     Flu Vaccine (1) 09/01/2019     FALL RISK ASSESSMENT  12/11/2019     Annual Wellness Visit  12/11/2019     Pneumococcal Vaccine (2 of 2 - PCV13) 12/11/2019

## 2019-12-18 NOTE — NURSING NOTE
Prior to immunization administration, verified patients identity using patient s name and date of birth. Please see Immunization Activity for additional information.     Screening Questionnaire for Adult Immunization    Are you sick today?   No   Do you have allergies to medications, food, a vaccine component or latex?   No   Have you ever had a serious reaction after receiving a vaccination?   No   Do you have a long-term health problem with heart, lung, kidney, or metabolic disease (e.g., diabetes), asthma, a blood disorder, no spleen, complement component deficiency, a cochlear implant, or a spinal fluid leak?  Are you on long-term aspirin therapy?   No   Do you have cancer, leukemia, HIV/AIDS, or any other immune system problem?   No   Do you have a parent, brother, or sister with an immune system problem?   No   In the past 3 months, have you taken medications that affect  your immune system, such as prednisone, other steroids, or anticancer drugs; drugs for the treatment of rheumatoid arthritis, Crohn s disease, or psoriasis; or have you had radiation treatments?   No   Have you had a seizure, or a brain or other nervous system problem?   No   During the past year, have you received a transfusion of blood or blood    products, or been given immune (gamma) globulin or antiviral drug?   No   For women: Are you pregnant or is there a chance you could become       pregnant during the next month?   No   Have you received any vaccinations in the past 4 weeks?   No     Immunization questionnaire answers were all negative.        Per orders of AKIN Hills, injection of Prevnar given by Марина Penny MA. Patient instructed to remain in clinic for 15 minutes afterwards, and to report any adverse reaction to me immediately.       Screening performed by Марина Penny MA on 12/18/2019 at 10:58 AM.

## 2020-01-22 DIAGNOSIS — F32.A DEPRESSION, UNSPECIFIED DEPRESSION TYPE: ICD-10-CM

## 2020-01-22 DIAGNOSIS — E03.9 HYPOTHYROIDISM, UNSPECIFIED TYPE: ICD-10-CM

## 2020-01-22 NOTE — TELEPHONE ENCOUNTER
"Requested Prescriptions   Pending Prescriptions Disp Refills     levothyroxine (SYNTHROID/LEVOTHROID) 75 MCG tablet  New pharmacy for this, last sent to [facility], please transfer  Last Written Prescription Date:  10/31/19  Last Fill Quantity: 90,  # refills: 3   Last Office Visit with [facility], [facility] or [facility] prescribing provider:  12/18/19-[facility]   Future Office Visit:    90 tablet 3     Sig: Take 1 tablet (75 mcg) by mouth daily Needs labs for more.       Thyroid Protocol Passed - 1/22/2020  2:14 PM        Passed - Patient is 12 years or older        Passed - Recent (12 mo) or future (30 days) visit within the authorizing provider's specialty     Patient has had an office visit with the authorizing provider or a provider within the authorizing providers department within the previous 12 mos or has a future within next 30 days. See \"Patient Info\" tab in inbasket, or \"Choose Columns\" in Meds & Orders section of the refill encounter.              Passed - Medication is active on med list        Passed - Normal TSH on file in past 12 months     Recent Labs   Lab Test 10/29/19  1644   TSH 1.64              Passed - No active pregnancy on record     If patient is pregnant or has had a positive pregnancy test, please check TSH.          Passed - No positive pregnancy test in past 12 months     If patient is pregnant or has had a positive pregnancy test, please check TSH.          traZODone (DESYREL) 50 MG tablet  Last Written Prescription Date:  1104/19  Last Fill Quantity: 30,  # refills: 3   Last Office Visit with [facility], [facility] or [facility] prescribing provider:  12/18/19-[facility]   Future Office Visit:    30 tablet 3     Sig: Take 1 tablet (50 mg) by mouth At Bedtime       Serotonin Modulators Passed - 1/22/2020  2:14 PM        Passed - Recent (12 mo) or future (30 days) visit within the authorizing provider's specialty     Patient has had an office visit with the authorizing provider or a provider within the " "authorizing providers department within the previous 12 mos or has a future within next 30 days. See \"Patient Info\" tab in inbasket, or \"Choose Columns\" in Meds & Orders section of the refill encounter.              Passed - Medication is active on med list        Passed - Patient is age 18 or older        Passed - No active pregnancy on record        Passed - No positive pregnancy test in past 12 months          "

## 2020-01-23 DIAGNOSIS — H40.1121 PRIMARY OPEN ANGLE GLAUCOMA OF LEFT EYE, MILD STAGE: ICD-10-CM

## 2020-01-23 RX ORDER — TIMOLOL MALEATE 5 MG/ML
1 SOLUTION/ DROPS OPHTHALMIC DAILY
Qty: 5 ML | Refills: 3 | Status: SHIPPED | OUTPATIENT
Start: 2020-01-23 | End: 2020-10-21

## 2020-01-24 RX ORDER — TRAZODONE HYDROCHLORIDE 50 MG/1
50 TABLET, FILM COATED ORAL AT BEDTIME
Qty: 30 TABLET | Refills: 2 | Status: SHIPPED | OUTPATIENT
Start: 2020-01-24

## 2020-01-24 RX ORDER — LEVOTHYROXINE SODIUM 75 UG/1
75 TABLET ORAL DAILY
Qty: 90 TABLET | Refills: 2 | Status: SHIPPED | OUTPATIENT
Start: 2020-01-24 | End: 2020-11-06

## 2020-01-24 NOTE — TELEPHONE ENCOUNTER
Prescription approved per [facility] Refill Protocol.    Sent to [facility] pharmacy    CHARY Sow/[facility]

## 2020-01-31 ENCOUNTER — ANCILLARY PROCEDURE (OUTPATIENT)
Dept: BONE DENSITY | Facility: CLINIC | Age: 65
End: 2020-01-31
Attending: FAMILY MEDICINE
Payer: COMMERCIAL

## 2020-01-31 DIAGNOSIS — E28.39 ESTROGEN DEFICIENCY: ICD-10-CM

## 2020-01-31 PROCEDURE — 77080 DXA BONE DENSITY AXIAL: CPT | Performed by: RADIOLOGY

## 2020-01-31 PROCEDURE — 77081 DXA BONE DENSITY APPENDICULR: CPT | Mod: 59 | Performed by: RADIOLOGY

## 2020-02-03 NOTE — RESULT ENCOUNTER NOTE
Ms. Bhat,    Your bone density study showed osteopenia.  This is mild loss of bone strength but not to the level of osteoporosis.  I am recommending that continue adequate calcium (1200mg/day) and vitamin D (2000 Units/day) and exercise as able.    Please contact the clinic if you have additional questions.  Thank you.    Sincerely,    Danna Pino MD

## 2020-05-09 ENCOUNTER — TELEPHONE (OUTPATIENT)
Dept: FAMILY MEDICINE | Facility: CLINIC | Age: 65
End: 2020-05-09

## 2020-05-09 NOTE — TELEPHONE ENCOUNTER
Panel Management Review   One phone call and send letter if unable to reach them or [employer] message and send letter if not read after 2 weeks (You will get a message to your inbasket)      BP Readings from Last 1 Encounters:   12/18/19 134/82        Health Maintenance Due   Topic Date Due     PHQ-9  03/09/2020        Fail List measure: Annual Wellness Visit        Patient is due/failing the following:   [facility] Wellness Visit    Action needed:   Patient needs office visit.    Type of outreach:    Phone, spoke to patient.  scheduled a appoinment for face to face in August     Questions for provider review:    None                                                                                       Chart routed to N/A .                                            Chani Salcido

## 2020-05-09 NOTE — TELEPHONE ENCOUNTER
Panel Management Review   One phone call and send letter if unable to reach them or [email or web address] message and send letter if not read after 2 weeks (You will get a message to your inbasket)      BP Readings from Last 1 Encounters:   12/18/19 134/82        Health Maintenance Due   Topic Date Due     PHQ-9  03/09/2020        Fail List measure: WELLNESS        Patient is due/failing the following:   WELLNESS    Action needed:   Pt needs a office visit.     Type of outreach:    Spoke to pt scheduled a appointment in August     Questions for provider review:    None                                                                                       Chart routed to N/A .                                            Chani Salcido

## 2020-05-11 ENCOUNTER — TELEPHONE (OUTPATIENT)
Dept: OPTOMETRY | Facility: CLINIC | Age: 65
End: 2020-05-11

## 2020-05-11 NOTE — TELEPHONE ENCOUNTER
5/11/2020    Per Caridad I called and spoke to patient. She wasn't sure of the names of the eye washes or drops that she discussed with Dr Padron at her 12/3/2019 visit and wanted that information.  Per patient request I copied that info and forwarded it to her via a [email or web address] message.  I also instructed her to call back if she needed any further assistance.  She also wanted to let Dr Padron know that she is back on her prescription drops that she had stopped prior to her last visit.    Patti Cisneros Optometric Assistant

## 2020-05-11 NOTE — TELEPHONE ENCOUNTER
Alma Rosa is Dr. Pink patient. She is having a flair up for Blepheritist. She is looking for an RX to be sent for this condition to [facility] pharmacy in [facility]. The Phone number to the pharmacy is [phone]. Any questions please call Alma Rosa at [phone].

## 2020-07-16 ENCOUNTER — MYC REFILL (OUTPATIENT)
Dept: FAMILY MEDICINE | Facility: CLINIC | Age: 65
End: 2020-07-16

## 2020-07-16 DIAGNOSIS — R09.82 POSTNASAL DRIP: ICD-10-CM

## 2020-07-17 RX ORDER — FLUTICASONE PROPIONATE 50 MCG
1 SPRAY, SUSPENSION (ML) NASAL DAILY
Qty: 16 G | Refills: 4 | Status: SHIPPED | OUTPATIENT
Start: 2020-07-17

## 2020-07-17 NOTE — TELEPHONE ENCOUNTER
Prescription approved per [facility] [place].      Dana Mir RN, BSN, PHN

## 2020-08-05 ENCOUNTER — VIRTUAL VISIT (OUTPATIENT)
Dept: FAMILY MEDICINE | Facility: CLINIC | Age: 65
End: 2020-08-05
Payer: COMMERCIAL

## 2020-08-05 DIAGNOSIS — E03.9 HYPOTHYROIDISM, UNSPECIFIED TYPE: Primary | ICD-10-CM

## 2020-08-05 PROCEDURE — 99207 ZZC NO BILLABLE SERVICE THIS VISIT: CPT | Performed by: FAMILY MEDICINE

## 2020-08-05 ASSESSMENT — ANXIETY QUESTIONNAIRES
7. FEELING AFRAID AS IF SOMETHING AWFUL MIGHT HAPPEN: NOT AT ALL
IF YOU CHECKED OFF ANY PROBLEMS ON THIS QUESTIONNAIRE, HOW DIFFICULT HAVE THESE PROBLEMS MADE IT FOR YOU TO DO YOUR WORK, TAKE CARE OF THINGS AT HOME, OR GET ALONG WITH OTHER PEOPLE: NOT DIFFICULT AT ALL
GAD7 TOTAL SCORE: 1
1. FEELING NERVOUS, ANXIOUS, OR ON EDGE: NOT AT ALL
3. WORRYING TOO MUCH ABOUT DIFFERENT THINGS: NOT AT ALL
6. BECOMING EASILY ANNOYED OR IRRITABLE: SEVERAL DAYS
5. BEING SO RESTLESS THAT IT IS HARD TO SIT STILL: NOT AT ALL
2. NOT BEING ABLE TO STOP OR CONTROL WORRYING: NOT AT ALL

## 2020-08-05 ASSESSMENT — PATIENT HEALTH QUESTIONNAIRE - PHQ9
SUM OF ALL RESPONSES TO PHQ QUESTIONS 1-9: 2
5. POOR APPETITE OR OVEREATING: NOT AT ALL

## 2020-08-05 NOTE — PROGRESS NOTES
"Alma Rosa Bhat is a 65 year old female who is being evaluated via a billable video visit.      The patient has been notified of following:     \"This video visit will be conducted via a call between you and your physician/provider. We have found that certain health care needs can be provided without the need for an in-person physical exam.  This service lets us provide the care you need with a video conversation.  If a prescription is necessary we can send it directly to your pharmacy.  If lab work is needed we can place an order for that and you can then stop by our lab to have the test done at a later time.    Video visits are billed at different rates depending on your insurance coverage.  Please reach out to your insurance provider with any questions.    If during the course of the call the physician/provider feels a video visit is not appropriate, you will not be charged for this service.\"    Patient has given verbal consent for Video visit? Yes  How would you like to obtain your AVS? MyChart  If you are dropped from the video visit, the video invite should be resent to: Text to cell phone: [phone]  Will anyone else be joining your video visit? No    Subjective     Alma Rosa Bhat is a 65 year old female who presents today via video visit for the following health issues:    HPI    Annual Wellness Visit    Are you in the first 12 months of your Medicare Part B coverage?  No    Physical Health:    In general, how would you rate your overall physical health? good    Outside of work, how many days during the week do you exercise?1 day/week    Outside of work, approximately how many minutes a day do you exercise?15-30 minutes    If you drink alcohol do you typically have >3 drinks per day or >7 drinks per week? No    Do you usually eat at least 4 servings of fruit and vegetables a day, include whole grains & fiber and avoid regularly eating high fat or \"junk\" foods? Yes    Do you have any problems taking " medications regularly? No    Do you have any side effects from medications? none    Needs assistance for the following daily activities: no assistance needed    Which of the following safety concerns are present in your home?  none identified     Hearing impairment: No    In the past 6 months, have you been bothered by leaking of urine? no    Mental Health:    In general, how would you rate your overall mental or emotional health? good  PHQ-2 Score:      Do you feel safe in your environment? Yes    Have you ever done Advance Care Planning? (For example, a Health Directive, POLST, or a discussion with a medical provider or your loved ones about your wishes)? No, advance care planning information given to patient to review.  Patient plans to discuss their wishes with loved ones or provider.      Fall risk:  Fallen 2 or more times in the past year?: No  Any fall with injury in the past year?: No  click delete button to remove this line now  Cognitive Screening: Unable to complete due to virtual visit; need for additional assessment in future face-to-face visit    Do you have sleep apnea, excessive snoring or daytime drowsiness?: daytime drowsiness    Current providers sharing in care for this patient include:   Patient Care Team:  Clinic, [facility] as PCP - Danna Desai MD as Assigned PCP        Video Start Time: 11:38 AM    AWV not yet due given last done in December.     Reviewed and updated as needed this visit by Provider  Tobacco  Allergies  Meds  Problems  Med Hx  Surg Hx  Fam Hx         Review of Systems         Objective             Physical Exam                   Assessment & Plan     1. Hypothyroidism, unspecified type  Placed future lab orders but visit not due until December  - **TSH with free T4 reflex FUTURE anytime; Future           Return in about 2 months (around 10/5/2020) for Lab Work. and AWV in Dec.    Danna Pino MD  [facility]  [facility]      Video-Visit Details    Type of service:  Video Visit    Video End Time:11:47 AM    Originating Location (pt. Location): Home    Distant Location (provider location):  [facility]     Platform used for Video Visit: Juan Valverde in about 2 months (around 10/5/2020) for Lab Work.       Danna Pino MD

## 2020-08-05 NOTE — PATIENT INSTRUCTIONS
Patient Education   Personalized Prevention Plan  You are due for the preventive services outlined below.  Your care team is available to assist you in scheduling these services.  If you have already completed any of these items, please share that information with your care team to update in your medical record.  There are no preventive care reminders to display for this patient.

## 2020-08-06 ASSESSMENT — ANXIETY QUESTIONNAIRES: GAD7 TOTAL SCORE: 1

## 2020-10-13 ENCOUNTER — VIRTUAL VISIT (OUTPATIENT)
Dept: FAMILY MEDICINE | Facility: CLINIC | Age: 65
End: 2020-10-13
Payer: COMMERCIAL

## 2020-10-13 DIAGNOSIS — Z53.9 ERRONEOUS ENCOUNTER--DISREGARD: Primary | ICD-10-CM

## 2020-10-13 NOTE — PROGRESS NOTES
"Alma Rosa Bhat is a 65 year old female who is being evaluated via a billable telephone visit.      The patient has been notified of following:     \"This telephone visit will be conducted via a call between you and your physician/provider. We have found that certain health care needs can be provided without the need for a physical exam.  This service lets us provide the care you need with a short phone conversation.  If a prescription is necessary we can send it directly to your pharmacy.  If lab work is needed we can place an order for that and you can then stop by our lab to have the test done at a later time.    Telephone visits are billed at different rates depending on your insurance coverage. During this emergency period, for some insurers they may be billed the same as an in-person visit.  Please reach out to your insurance provider with any questions.    If during the course of the call the physician/provider feels a telephone visit is not appropriate, you will not be charged for this service.\"    Patient has given verbal consent for Telephone visit?  Yes    What phone number would you like to be contacted at? [phone]    How would you like to obtain your AVS? MyChart    Subjective     Alma Rosa Bhat is a 65 year old female who presents via phone visit today for the following health issues:    HPI            Review of Systems        Objective          Vitals:  No vitals were obtained today due to virtual visit.      PSYCH: Alert and oriented times 3; coherent speech, normal   rate and volume, able to articulate logical thoughts, able   to abstract reason, no tangential thoughts, no hallucinations   or delusions  Her affect is   RESP: No cough, no audible wheezing, able to talk in full sentences  Remainder of exam unable to be completed due to telephone visits            Assessment/Plan:    Not needed.    Phone call duration:  0 minutes              "

## 2020-10-17 DIAGNOSIS — E03.9 HYPOTHYROIDISM, UNSPECIFIED TYPE: ICD-10-CM

## 2020-10-17 PROCEDURE — 84443 ASSAY THYROID STIM HORMONE: CPT | Performed by: FAMILY MEDICINE

## 2020-10-17 PROCEDURE — 36415 COLL VENOUS BLD VENIPUNCTURE: CPT | Performed by: FAMILY MEDICINE

## 2020-10-19 ENCOUNTER — MYC MEDICAL ADVICE (OUTPATIENT)
Dept: OPTOMETRY | Facility: CLINIC | Age: 65
End: 2020-10-19

## 2020-10-19 DIAGNOSIS — H40.1121 PRIMARY OPEN ANGLE GLAUCOMA OF LEFT EYE, MILD STAGE: ICD-10-CM

## 2020-10-19 LAB — TSH SERPL DL<=0.005 MIU/L-ACNC: 1.45 MU/L (ref 0.4–4)

## 2020-10-19 NOTE — RESULT ENCOUNTER NOTE
Ms. Bhat,    Your TSH indicates that your thyroid function is currently in balance.  No additional testing is necessary for a year or unless you develop symptoms of over or underactive thyroid.    Please contact the clinic if you have additional questions.  Thank you.    Sincerely,    Danna Pino MD

## 2020-10-21 RX ORDER — TIMOLOL MALEATE 5 MG/ML
1 SOLUTION/ DROPS OPHTHALMIC DAILY
Qty: 5 ML | Refills: 1 | Status: SHIPPED | OUTPATIENT
Start: 2020-10-21 | End: 2021-08-11

## 2020-11-04 DIAGNOSIS — E03.9 HYPOTHYROIDISM, UNSPECIFIED TYPE: ICD-10-CM

## 2020-11-06 RX ORDER — LEVOTHYROXINE SODIUM 75 UG/1
75 TABLET ORAL DAILY
Qty: 90 TABLET | Refills: 3 | Status: SHIPPED | OUTPATIENT
Start: 2020-11-06

## 2020-12-07 ENCOUNTER — OFFICE VISIT (OUTPATIENT)
Dept: OPHTHALMOLOGY | Facility: CLINIC | Age: 65
End: 2020-12-07
Payer: COMMERCIAL

## 2020-12-07 DIAGNOSIS — H40.1121 PRIMARY OPEN ANGLE GLAUCOMA (POAG) OF LEFT EYE, MILD STAGE: Primary | ICD-10-CM

## 2020-12-07 PROCEDURE — 99207 PR NO BILLABLE SERVICE THIS VISIT: CPT | Performed by: OPHTHALMOLOGY

## 2020-12-07 ASSESSMENT — REFRACTION_MANIFEST
OS_CYLINDER: +0.75
OD_AXIS: 080
OD_ADD: +2.75
OD_CYLINDER: +0.50
OS_AXIS: 085
OS_SPHERE: +2.50
OS_ADD: +2.75
OD_SPHERE: +2.50

## 2020-12-07 ASSESSMENT — REFRACTION_WEARINGRX
OS_SPHERE: +2.50
OD_AXIS: 080
OS_ADD: +2.75
OD_CYLINDER: +0.75
OS_SPHERE: +2.50
OD_CYLINDER: +0.50
OD_SPHERE: +2.25
OS_AXIS: 060
SPECS_TYPE: PAL
OS_CYLINDER: +0.50
OD_SPHERE: +2.25
OS_CYLINDER: +0.75
OD_AXIS: 080
OS_AXIS: 070
OS_ADD: +2.75
OD_ADD: +2.75
OD_ADD: +2.75

## 2020-12-07 ASSESSMENT — TONOMETRY
IOP_METHOD: TONOPEN
OD_IOP_MMHG: 15
OS_IOP_MMHG: 18

## 2020-12-07 ASSESSMENT — VISUAL ACUITY
CORRECTION_TYPE: GLASSES
OD_CC: 20/20
OS_CC: 20/15
OS_CC+: -2
METHOD: SNELLEN - LINEAR

## 2020-12-07 NOTE — NURSING NOTE
Chief Complaints and History of Present Illnesses   Patient presents with     Glaucoma      Chief Complaint(s) and History of Present Illness(es)     Glaucoma     Laterality: both eyes    Treatment side effects: redness              Comments     Annual eye exam of both eyes.  Concerns of redness left eye al the time.  Some tearing each eye.  Denies blurred vision, change in vision, eye pain.  Eye meds: timolol left eye   RANDA De La Cruz 12/7/2020 10:02 AM

## 2021-01-15 ENCOUNTER — HEALTH MAINTENANCE LETTER (OUTPATIENT)
Age: 66
End: 2021-01-15

## 2021-01-20 ASSESSMENT — CUP TO DISC RATIO
OS_RATIO: 0.25
OD_RATIO: 0.2

## 2021-01-20 ASSESSMENT — SLIT LAMP EXAM - LIDS
COMMENTS: NORMAL
COMMENTS: NORMAL

## 2021-01-20 ASSESSMENT — EXTERNAL EXAM - LEFT EYE: OS_EXAM: NORMAL

## 2021-01-20 ASSESSMENT — EXTERNAL EXAM - RIGHT EYE: OD_EXAM: NORMAL

## 2021-01-20 NOTE — PROGRESS NOTES
Assessment & Plan      Alma Rosa Bhat is a 64 year old female who presents with:   Review of systems for the eyes was negative other than the pertinent positives and negatives noted in the HPI.     Primary open angle glaucoma of left eye, mild stage  - h/o OHTN (reportedly in the 30's) left eye  - OCT Optic Nerve RNFL Optovue OU (both eyes)  - HVF 24-2 OU  - timolol maleate (TIMOPTIC) 0.5 % ophthalmic solution every day left eye      Return in 12 months for annual exam (glaucoma testing)

## 2021-03-21 ENCOUNTER — HEALTH MAINTENANCE LETTER (OUTPATIENT)
Age: 66
End: 2021-03-21

## 2021-08-11 DIAGNOSIS — H40.1121 PRIMARY OPEN ANGLE GLAUCOMA OF LEFT EYE, MILD STAGE: ICD-10-CM

## 2021-08-11 RX ORDER — TIMOLOL MALEATE 5 MG/ML
1 SOLUTION/ DROPS OPHTHALMIC DAILY
Qty: 5 ML | Refills: 1 | Status: SHIPPED | OUTPATIENT
Start: 2021-08-11

## 2021-08-11 NOTE — PROGRESS NOTES
Refill request for timolol received from [facility]. Last appointment 12/2020, no appointments scheduled.  Medication refilled as requested. Message sent to pt to schedule appointment. Ailyn Cartagena RN

## 2021-08-29 DIAGNOSIS — E03.9 HYPOTHYROIDISM, UNSPECIFIED TYPE: ICD-10-CM

## 2021-08-30 RX ORDER — LEVOTHYROXINE SODIUM 75 UG/1
75 TABLET ORAL DAILY
Qty: 90 TABLET | Refills: 3 | OUTPATIENT
Start: 2021-08-30

## 2021-09-01 RX ORDER — LEVOTHYROXINE SODIUM 75 UG/1
75 TABLET ORAL DAILY
Qty: 90 TABLET | Refills: 3 | OUTPATIENT
Start: 2021-09-01

## 2021-09-01 NOTE — TELEPHONE ENCOUNTER
Please re-send if appropriate.  It looks like this script was created and not sent to pharmacy?  [employer] still requesting.

## 2021-09-04 ENCOUNTER — HEALTH MAINTENANCE LETTER (OUTPATIENT)
Age: 66
End: 2021-09-04

## 2021-10-30 ENCOUNTER — HEALTH MAINTENANCE LETTER (OUTPATIENT)
Age: 66
End: 2021-10-30

## 2022-05-16 ENCOUNTER — TELEPHONE (OUTPATIENT)
Dept: OPHTHALMOLOGY | Facility: CLINIC | Age: 67
End: 2022-05-16
Payer: COMMERCIAL

## 2022-10-22 ENCOUNTER — HEALTH MAINTENANCE LETTER (OUTPATIENT)
Age: 67
End: 2022-10-22

## 2022-12-04 ENCOUNTER — HEALTH MAINTENANCE LETTER (OUTPATIENT)
Age: 67
End: 2022-12-04

## 2023-04-01 ENCOUNTER — HEALTH MAINTENANCE LETTER (OUTPATIENT)
Age: 68
End: 2023-04-01

## 2024-01-14 ENCOUNTER — HEALTH MAINTENANCE LETTER (OUTPATIENT)
Age: 69
End: 2024-01-14